# Patient Record
Sex: FEMALE | Race: WHITE | NOT HISPANIC OR LATINO | Employment: FULL TIME | URBAN - METROPOLITAN AREA
[De-identification: names, ages, dates, MRNs, and addresses within clinical notes are randomized per-mention and may not be internally consistent; named-entity substitution may affect disease eponyms.]

---

## 2020-05-28 ENCOUNTER — TELEPHONE (OUTPATIENT)
Dept: UROLOGY | Facility: MEDICAL CENTER | Age: 50
End: 2020-05-28

## 2020-06-01 ENCOUNTER — OFFICE VISIT (OUTPATIENT)
Dept: UROLOGY | Facility: CLINIC | Age: 50
End: 2020-06-01
Payer: COMMERCIAL

## 2020-06-01 VITALS
WEIGHT: 180 LBS | HEIGHT: 61 IN | BODY MASS INDEX: 33.99 KG/M2 | DIASTOLIC BLOOD PRESSURE: 70 MMHG | SYSTOLIC BLOOD PRESSURE: 138 MMHG | HEART RATE: 90 BPM

## 2020-06-01 DIAGNOSIS — N20.0 KIDNEY STONES: Primary | ICD-10-CM

## 2020-06-01 LAB
SL AMB  POCT GLUCOSE, UA: NORMAL
SL AMB LEUKOCYTE ESTERASE,UA: NORMAL
SL AMB POCT BILIRUBIN,UA: NORMAL
SL AMB POCT BLOOD,UA: NORMAL
SL AMB POCT CLARITY,UA: CLEAR
SL AMB POCT COLOR,UA: YELLOW
SL AMB POCT KETONES,UA: NORMAL
SL AMB POCT NITRITE,UA: NORMAL
SL AMB POCT PH,UA: 5
SL AMB POCT SPECIFIC GRAVITY,UA: 1.02
SL AMB POCT URINE PROTEIN: NORMAL
SL AMB POCT UROBILINOGEN: 0.2

## 2020-06-01 PROCEDURE — 81002 URINALYSIS NONAUTO W/O SCOPE: CPT | Performed by: PHYSICIAN ASSISTANT

## 2020-06-01 PROCEDURE — 99203 OFFICE O/P NEW LOW 30 MIN: CPT | Performed by: PHYSICIAN ASSISTANT

## 2020-06-01 RX ORDER — MELOXICAM 7.5 MG/1
7.5 TABLET ORAL DAILY
COMMUNITY

## 2020-06-01 RX ORDER — GABAPENTIN 300 MG/1
300 CAPSULE ORAL 3 TIMES DAILY
COMMUNITY

## 2020-06-01 RX ORDER — CYCLOBENZAPRINE HCL 5 MG
5 TABLET ORAL 3 TIMES DAILY PRN
COMMUNITY

## 2020-06-01 RX ORDER — HYDROCODONE BITARTRATE AND ACETAMINOPHEN 5; 325 MG/1; MG/1
1 TABLET ORAL EVERY 6 HOURS PRN
COMMUNITY

## 2020-06-01 RX ORDER — ONDANSETRON 4 MG/1
4 TABLET, ORALLY DISINTEGRATING ORAL EVERY 6 HOURS PRN
COMMUNITY

## 2020-06-01 RX ORDER — LORATADINE AND PSEUDOEPHEDRINE 10; 240 MG/1; MG/1
1 TABLET, EXTENDED RELEASE ORAL DAILY
COMMUNITY

## 2020-06-01 RX ORDER — PSEUDOEPHEDRINE HYDROCHLORIDE 30 MG/1
60 TABLET ORAL EVERY 4 HOURS PRN
COMMUNITY

## 2020-07-21 ENCOUNTER — TELEPHONE (OUTPATIENT)
Dept: UROLOGY | Facility: CLINIC | Age: 50
End: 2020-07-21

## 2020-07-21 NOTE — TELEPHONE ENCOUNTER
Patient called and reminded to get US done prior to appt  Patient states she need to reschedule appt due to not having a ride and US will not be done in time  Central Scheduling number was provided to patient and will call back to reschedule OV after US is completed

## 2020-08-20 ENCOUNTER — HOSPITAL ENCOUNTER (OUTPATIENT)
Dept: ULTRASOUND IMAGING | Facility: HOSPITAL | Age: 50
Discharge: HOME | End: 2020-08-20
Payer: COMMERCIAL

## 2020-08-20 DIAGNOSIS — N20.0 KIDNEY STONES: ICD-10-CM

## 2020-08-20 PROCEDURE — 76770 US EXAM ABDO BACK WALL COMP: CPT

## 2021-06-02 DIAGNOSIS — N20.0 KIDNEY STONES: Primary | ICD-10-CM

## 2021-07-09 ENCOUNTER — OFFICE VISIT (OUTPATIENT)
Dept: UROLOGY | Facility: CLINIC | Age: 51
End: 2021-07-09
Payer: COMMERCIAL

## 2021-07-09 VITALS
SYSTOLIC BLOOD PRESSURE: 122 MMHG | WEIGHT: 196.5 LBS | HEART RATE: 65 BPM | BODY MASS INDEX: 38.58 KG/M2 | DIASTOLIC BLOOD PRESSURE: 64 MMHG | HEIGHT: 60 IN

## 2021-07-09 DIAGNOSIS — N20.0 NEPHROLITHIASIS: Primary | ICD-10-CM

## 2021-07-09 LAB
BACTERIA UR QL AUTO: ABNORMAL /HPF
BILIRUB UR QL STRIP: NEGATIVE
CLARITY UR: CLEAR
COLOR UR: YELLOW
GLUCOSE UR STRIP-MCNC: NEGATIVE MG/DL
HGB UR QL STRIP.AUTO: ABNORMAL
HYALINE CASTS #/AREA URNS LPF: ABNORMAL /LPF
KETONES UR STRIP-MCNC: NEGATIVE MG/DL
LEUKOCYTE ESTERASE UR QL STRIP: ABNORMAL
NITRITE UR QL STRIP: NEGATIVE
NON-SQ EPI CELLS URNS QL MICRO: ABNORMAL /HPF
PH UR STRIP.AUTO: 6 [PH]
PROT UR STRIP-MCNC: NEGATIVE MG/DL
RBC #/AREA URNS AUTO: ABNORMAL /HPF
SL AMB  POCT GLUCOSE, UA: NORMAL
SL AMB LEUKOCYTE ESTERASE,UA: NORMAL
SL AMB POCT BILIRUBIN,UA: NORMAL
SL AMB POCT BLOOD,UA: NORMAL
SL AMB POCT CLARITY,UA: CLEAR
SL AMB POCT COLOR,UA: NORMAL
SL AMB POCT KETONES,UA: NORMAL
SL AMB POCT NITRITE,UA: NORMAL
SL AMB POCT PH,UA: 5
SL AMB POCT SPECIFIC GRAVITY,UA: 1.03
SL AMB POCT URINE PROTEIN: NORMAL
SL AMB POCT UROBILINOGEN: NORMAL
SP GR UR STRIP.AUTO: 1.01 (ref 1–1.03)
UROBILINOGEN UR QL STRIP.AUTO: 0.2 E.U./DL
WBC #/AREA URNS AUTO: ABNORMAL /HPF

## 2021-07-09 PROCEDURE — 81002 URINALYSIS NONAUTO W/O SCOPE: CPT | Performed by: PHYSICIAN ASSISTANT

## 2021-07-09 PROCEDURE — 81001 URINALYSIS AUTO W/SCOPE: CPT | Performed by: PHYSICIAN ASSISTANT

## 2021-07-09 PROCEDURE — 99213 OFFICE O/P EST LOW 20 MIN: CPT | Performed by: PHYSICIAN ASSISTANT

## 2021-07-09 RX ORDER — LORATADINE 10 MG/1
TABLET ORAL
COMMUNITY

## 2021-07-09 RX ORDER — PRAVASTATIN SODIUM 20 MG
TABLET ORAL
COMMUNITY

## 2021-07-09 NOTE — PROGRESS NOTES
7/9/2021      Chief Complaint   Patient presents with    Nephrolithiasis     Assessment and Plan    1  Nephrolithiasis   - Ultrasound kidney and bladder from 8/20/20 was normal and negative for hydronephrosis or shadowing calculi  Bilateral ureteral jets were detected  - currently asymptomatic   - Continue proper hydration and dietary modifications to prevent future kidney stone formation  2  Hx of reported microscopic hematuria  3  Hx of chemical exposure  - UA micro from 4/24/20 showed 0 RBCs  Prior urine dipsticks did show + blood  - Urine dip + leukocytes and 3+ blood  Will send out for UA micro  If 3 or more RBCs, will proceed with hematuria work-up  History of Present Illness  Colleen Jacobson is a 46 y o  female here for follow up evaluation of  Nephrolithiasis  Previously patient was seen in June of 2020 with 5 mm right UVJ stone  She completed follow-up imaging with ultrasound in August 2020 which was negative for any obstructing urinary calculi or hydronephrosis  Did report 1 episode of right upper abdominal pain that lasted 5 minutes and resolved  Otherwise currently denies any abdominal pain, flank pain, or new urinary symptoms  Denies any fever chills  She denies any prior history of kidney stones     She does report prior history of microscopic hematuria however this was only seen on preliminary urine dipsticks and urine microscopic from April 2020 was negative for microscopic hematuria  She denies any history gross hematuria  She denies any family history of  malignancy  Denies ever smoking  She does report previously working in [place] [address]  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  Review of Systems   Constitutional: Negative for chills and fever  Respiratory: Negative for shortness of breath  Cardiovascular: Negative for chest pain  Gastrointestinal: Negative for abdominal pain, constipation, diarrhea, nausea and vomiting     Genitourinary: Negative for difficulty urinating, dysuria, flank pain, frequency, hematuria and urgency  Neurological: Negative for dizziness  Past Medical History  Past Medical History:   Diagnosis Date    Heart burn     Kidney stones        Past Social History  Past Surgical History:   Procedure Laterality Date     SECTION       Social History     Tobacco Use   Smoking Status Never Smoker   Smokeless Tobacco Never Used       Past Family History  Family History   Problem Relation Age of Onset    Heart attack Father        Past Social history  Social History     Socioeconomic History    Marital status:      Spouse name: Not on file    Number of children: Not on file    Years of education: Not on file    Highest education level: Not on file   Occupational History    Not on file   Tobacco Use    Smoking status: Never Smoker    Smokeless tobacco: Never Used   Vaping Use    Vaping Use: Never used   Substance and Sexual Activity    Alcohol use: Never    Drug use: Never    Sexual activity: Not on file   Other Topics Concern    Not on file   Social History Narrative    Not on file     Social Determinants of Health     Financial Resource Strain:     Difficulty of Paying Living Expenses:    Food Insecurity:     Worried About [address] in the Last Year:     [address] in the Last Year:    Transportation Needs:     Lack of Transportation (Medical):      Lack of Transportation (Non-Medical):    Physical Activity:     Days of Exercise per Week:     Minutes of Exercise per Session:    Stress:     Feeling of Stress :    Social Connections:     Frequency of Communication with Friends and Family:     Frequency of Social Gatherings with Friends and Family:     Attends Rastafarian Services:     Active Member of Clubs or Organizations:     Attends Club or Organization Meetings:     Marital Status:    Intimate Partner Violence:     Fear of Current or Ex-Partner:     Emotionally Abused:     Physically Abused:     Sexually Abused:        Current Medications  Current Outpatient Medications   Medication Sig Dispense Refill    ALBUTEROL IN Inhale      cyclobenzaprine (FLEXERIL) 5 mg tablet Take 5 mg by mouth 3 (three) times a day as needed for muscle spasms      gabapentin (NEURONTIN) 300 mg capsule Take 300 mg by mouth 3 (three) times a day      HYDROcodone-acetaminophen (NORCO) 5-325 mg per tablet Take 1 tablet by mouth every 6 (six) hours as needed for pain      loratadine (CLARITIN) 10 mg tablet loratadine 10 mg tablet   TAKE 1 TABLET BY MOUTH ONCE DAILY      loratadine-pseudoephedrine (CLARITIN-D 24-HOUR)  mg per 24 hr tablet Take 1 tablet by mouth daily      meloxicam (MOBIC) 7 5 mg tablet Take 7 5 mg by mouth daily      ondansetron (ZOFRAN-ODT) 4 mg disintegrating tablet Take 4 mg by mouth every 6 (six) hours as needed for nausea or vomiting      pravastatin (PRAVACHOL) 20 mg tablet pravastatin 20 mg tablet   TAKE 1 TABLET BY MOUTH NIGHTLY      pseudoephedrine (SUDAFED) 30 mg tablet Take 60 mg by mouth every 4 (four) hours as needed for congestion       No current facility-administered medications for this visit  Allergies  Allergies   Allergen Reactions    Codeine     Diphenhydramine Tongue Swelling     Medicated Sleeping Pills    Oxycodone     Other Rash     Added based on information entered during case entry, please review and add reactions, type, and severity as needed         The following portions of the patient's history were reviewed and updated as appropriate: allergies, current medications, past medical history, past social history, past surgical history and problem list       Vitals  Vitals:    07/09/21 1445   BP: 122/64   BP Location: Left arm   Patient Position: Sitting   Cuff Size: Adult   Pulse: 65   Weight: 89 1 kg (196 lb 8 oz)   Height: 5' (1 524 m)           Physical Exam  Physical Exam  Constitutional:       Appearance: Normal appearance     HENT:      Head: Normocephalic and atraumatic  Right Ear: External ear normal       Left Ear: External ear normal    Eyes:      General: No scleral icterus  Conjunctiva/sclera: Conjunctivae normal    Cardiovascular:      Pulses: Normal pulses  Pulmonary:      Effort: Pulmonary effort is normal    Abdominal:      General: Abdomen is flat  Palpations: Abdomen is soft  Tenderness: There is no abdominal tenderness  There is no right CVA tenderness or left CVA tenderness  Musculoskeletal:         General: Normal range of motion  Cervical back: Normal range of motion  Skin:     General: Skin is warm and dry  Neurological:      General: No focal deficit present  Mental Status: She is alert and oriented to person, place, and time  Psychiatric:         Mood and Affect: Mood normal          Behavior: Behavior normal          Thought Content:  Thought content normal          Judgment: Judgment normal            Results  Recent Results (from the past 1 hour(s))   POCT urine dip    Collection Time: 07/09/21  3:00 PM   Result Value Ref Range    LEUKOCYTE ESTERASE,UA ++     NITRITE,UA -     SL AMB POCT UROBILINOGEN -     POCT URINE PROTEIN -      PH,UA 5 0     BLOOD,UA +++     SPECIFIC GRAVITY,UA 1 030     KETONES,UA -     BILIRUBIN,UA -     GLUCOSE, UA -      COLOR,UA pale yellow     CLARITY,UA clear    ]  No results found for: PSA  No results found for: GLUCOSE, CALCIUM, NA, K, CO2, CL, BUN, CREATININE  No results found for: WBC, HGB, HCT, MCV, PLT        Orders  Orders Placed This Encounter   Procedures    POCT urine dip       NaeemDoyline MAGGI Pelayo

## 2024-03-19 ENCOUNTER — OFFICE VISIT (OUTPATIENT)
Dept: DENTISTRY | Facility: CLINIC | Age: 54
End: 2024-03-19

## 2024-03-19 VITALS — SYSTOLIC BLOOD PRESSURE: 102 MMHG | DIASTOLIC BLOOD PRESSURE: 71 MMHG | HEART RATE: 76 BPM

## 2024-03-19 DIAGNOSIS — K04.7 PERIAPICAL ABSCESS WITHOUT SINUS: Primary | ICD-10-CM

## 2024-03-19 PROCEDURE — D0220 INTRAORAL - PERIAPICAL FIRST RADIOGRAPHIC IMAGE: HCPCS

## 2024-03-19 PROCEDURE — D0140 LIMITED ORAL EVALUATION - PROBLEM FOCUSED: HCPCS

## 2024-03-19 PROCEDURE — D0330 PANORAMIC RADIOGRAPHIC IMAGE: HCPCS

## 2024-03-19 RX ORDER — AMOXICILLIN 500 MG/1
500 CAPSULE ORAL EVERY 8 HOURS SCHEDULED
Qty: 21 CAPSULE | Refills: 0 | Status: SHIPPED | OUTPATIENT
Start: 2024-03-19 | End: 2024-03-26

## 2024-03-19 NOTE — DENTAL PROCEDURE DETAILS
Pt presents for a limited exam. Verbal consent for treatment given in addition to the forms.  Reviewed health history - Patient is ASA I  Consents signed: Yes  Perio: TBD  Pain Scale: 2  Caries Assessment: medium  Oral Hygiene instruction reviewed and given.  Hygiene recall visits recommended to the patient     Pt stated that she bit in a chocolate caramel candy and her front tooth (points to #9) chipped. Pt stated that she put an OTC dental material over the front tooth. Pt then stated that she has broken teeth, one of which she stated extracted herself. Pt states that she does have pain on her upper right and that she regularly pops the abscesses. Pt stated she had difficulty being seen by a dentist that accepts her dental insurance.    Clinical examination completed:  -oral cancer screening WNL  -E/O: WNL  -I/O:  -drained open parulis on the buccal gingiva apical to 3 and 5. 3 and 5 both have a root tip  -root tips noted on 3, 15, 14, 15, and 20  -29 missing (pt stated she extracted it herself after the tooth broke)  -9: no abscess, palpation WNL, percussion +, cold + lingering for 5 seconds Dx: reversible pulpitis    Radiographs:  PA #9: caries possibly extending to the pulp, widened PDL, no PARL  Panoramic: root tips 3, 5, 14, 15, and 20 noted. Radio-apical radiolucency noted around apex of 5.    Discussed with pt that due to the active infection she has in her teeth, her txp should be as:  Extraction root tips 3 and 5  Extraction root tips 14, 15, and 20  Palliative #9 (filling if not in the pulp chamber)  Comp exam and xrays  Pt understood and agreed to txp. Discussed with pt that the caries in her front tooth might be in the nerve, requiring further treatment like a RCT, if restorable. Pt understood.    Rx: amoxicillin 500mg q8h x 7 days sent to pt pharmacy. Confirmed no allergies to penicillin    All pt questions answered and pt understood.    Pt left satisfied and ambulatory.     Prognosis is  Good.  Referrals needed: No    Next visit: extraction root tips 3 and 5  Attending: Dr. Broussard

## 2024-04-25 ENCOUNTER — OFFICE VISIT (OUTPATIENT)
Age: 54
End: 2024-04-25
Payer: COMMERCIAL

## 2024-04-25 ENCOUNTER — APPOINTMENT (OUTPATIENT)
Age: 54
End: 2024-04-25
Payer: COMMERCIAL

## 2024-04-25 VITALS
OXYGEN SATURATION: 98 % | WEIGHT: 176.4 LBS | RESPIRATION RATE: 16 BRPM | DIASTOLIC BLOOD PRESSURE: 72 MMHG | SYSTOLIC BLOOD PRESSURE: 110 MMHG | BODY MASS INDEX: 34.45 KG/M2 | TEMPERATURE: 98.5 F | HEART RATE: 84 BPM

## 2024-04-25 DIAGNOSIS — S63.501A SPRAIN OF RIGHT WRIST, INITIAL ENCOUNTER: ICD-10-CM

## 2024-04-25 DIAGNOSIS — M79.641 HAND PAIN, RIGHT: ICD-10-CM

## 2024-04-25 DIAGNOSIS — M93.839: Primary | ICD-10-CM

## 2024-04-25 PROCEDURE — 73110 X-RAY EXAM OF WRIST: CPT

## 2024-04-25 PROCEDURE — 99213 OFFICE O/P EST LOW 20 MIN: CPT | Performed by: PHYSICIAN ASSISTANT

## 2024-04-25 NOTE — PROGRESS NOTES
[facility]        NAME: Albina Dykes is a 54 y.o. female  : 1970    MRN: [id number]  DATE: 2024  TIME: 7:23 PM    Assessment and Plan   Adult osteochondrosis of carpal lunate [M93.839]  1. Adult osteochondrosis of carpal lunate  Ambulatory Referral to Orthopedic Surgery      2. Hand pain, right  XR wrist 3+ vw right    CANCELED: XR hand 3+ vw right      3. 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    Shared decision-making with the patient was engaged in.  All questions, concerns and  complaints were answered and addressed to the patient's satisfaction.    Patient Instructions   There are no Patient Instructions on file for this visit.    Follow up with PCP in 3-5 days.  Proceed to  ER if symptoms worsen.    If tests are performed, our office will contact you with results only if   changes need to made to the care plan discussed with you at the visit.   [facility]     Chief Complaint     Chief Complaint   Patient presents with   • Hand Injury     Pain R hand. Claims while carrying objects missed a step and fell down 5 carpeted wooden steps 1 1/2 weeks ago. Denies other injury. Pain increases with movement, gripping         History of Present Illness       HPI  Patient presents complaining of right wrist and hand pain.  She reports that she fell down the stairs 5 days ago.  She does report history of cervical radiculopathy.  She is right-hand dominant.  She has pain with gripping the dorsal and volar wrist.  She has numbness and tingling with gripping as well but no numbness and tingling at rest.    Review of Systems   Review of  Systems  All other related systems reviewed and are negative except as noted in HPI    Current Medications       Current Outpatient Medications:   •  ALBUTEROL IN, Inhale, Disp: , Rfl:   •  cyclobenzaprine (FLEXERIL) 5 mg tablet, Take 5 mg by mouth 3 (three) times a day as needed for muscle spasms, Disp: , Rfl:   •  loratadine (CLARITIN) 10 mg tablet, loratadine 10 mg tablet  TAKE 1 TABLET BY MOUTH ONCE DAILY, Disp: , Rfl:   •  loratadine-pseudoephedrine (CLARITIN-D 24-HOUR)  mg per 24 hr tablet, Take 1 tablet by mouth daily, Disp: , Rfl:   •  meloxicam (MOBIC) 7.5 mg tablet, Take 7.5 mg by mouth daily, Disp: , Rfl:   •  pravastatin (PRAVACHOL) 20 mg tablet, pravastatin 20 mg tablet  TAKE 1 TABLET BY MOUTH NIGHTLY, Disp: , Rfl:   •  pseudoephedrine (SUDAFED) 30 mg tablet, Take 60 mg by mouth every 4 (four) hours as needed for congestion, Disp: , Rfl:   •  gabapentin (NEURONTIN) 300 mg capsule, Take 300 mg by mouth 3 (three) times a day (Patient not taking: Reported on 4/25/2024), Disp: , Rfl:   •  HYDROcodone-acetaminophen (NORCO) 5-325 mg per tablet, Take 1 tablet by mouth every 6 (six) hours as needed for pain (Patient not taking: Reported on 4/25/2024), Disp: , Rfl:   •  ondansetron (ZOFRAN-ODT) 4 mg disintegrating tablet, Take 4 mg by mouth every 6 (six) hours as needed for nausea or vomiting (Patient not taking: Reported on 4/25/2024), Disp: , Rfl:     Current Allergies     Allergies as of 04/25/2024 - Reviewed 04/25/2024   Allergen Reaction Noted   • Codeine  10/11/2012   • Diphenhydramine Tongue Swelling 04/20/2021   • Oxycodone  02/21/2019   • Other Rash 03/02/2020            The following portions of the patient's history were reviewed and updated as appropriate: allergies, current medications, past family history, past medical history, past social history, past surgical history and problem list.     Past Medical History:   Diagnosis Date   • Heart burn    • Kidney stones        Past Surgical History:  "  Procedure Laterality Date   •  SECTION         Family History   Problem Relation Age of Onset   • Heart attack Father          Medications have been verified.        Objective   /72 (BP Location: Left arm, Patient Position: Sitting, Cuff Size: Adult)   Pulse 84   Temp 98.5 °F (36.9 °C) (Tympanic)   Resp 16   Wt 80 kg (176 lb 6.4 oz)   LMP 2024 (Approximate) Comment: Very irregular menses  SpO2 98%   BMI 34.45 kg/m²   Patient's last menstrual period was 2024 (approximate).       Physical Exam     Physical Exam    Right Hand Exam     Tenderness   Right hand tenderness location: Tenderness over the radiocarpal joint and lunate no tenderness over the scaphoid or snuffbox.    Range of Motion   Wrist   Extension:  abnormal   Flexion:  abnormal   Pronation:  normal   Supination:  normal     Other   Erythema: absent  Scars: absent  Sensation: normal  Pulse: present            I have personally reviewed pertinent films in PACS and my interpretation is x-ray of the right wrist performed today demonstrates no acute displaced fracture there is some degenerative changes seen in the visualized IP joints of the fingers, there is no visualized scaphoid fracture, there are cystic lucencies in the lunate as well as ulnar positivity in the wrist.    Procedures  No Procedures performed today        Note: Portions of this record may have been created with voice recognition software. Occasional wrong word or \"sound a like\" substitutions may have occurred due to the inherent limitations of voice recognition software. Please read the chart carefully and recognize, using context, where substitutions have occurred.*      "

## 2024-05-14 ENCOUNTER — OFFICE VISIT (OUTPATIENT)
Dept: OBGYN CLINIC | Facility: CLINIC | Age: 54
End: 2024-05-14
Payer: COMMERCIAL

## 2024-05-14 VITALS
DIASTOLIC BLOOD PRESSURE: 78 MMHG | HEIGHT: 60 IN | SYSTOLIC BLOOD PRESSURE: 115 MMHG | HEART RATE: 65 BPM | BODY MASS INDEX: 33.22 KG/M2 | WEIGHT: 169.2 LBS

## 2024-05-14 DIAGNOSIS — S69.91XA INJURY OF RIGHT WRIST, INITIAL ENCOUNTER: Primary | ICD-10-CM

## 2024-05-14 PROCEDURE — 99203 OFFICE O/P NEW LOW 30 MIN: CPT | Performed by: FAMILY MEDICINE

## 2024-05-14 RX ORDER — FLUTICASONE PROPIONATE 50 MCG
2 SPRAY, SUSPENSION (ML) NASAL DAILY
COMMUNITY
Start: 2023-12-18

## 2024-05-14 RX ORDER — PRAVASTATIN SODIUM 40 MG
40 TABLET ORAL EVERY EVENING
COMMUNITY
Start: 2024-03-13

## 2024-05-14 NOTE — PROGRESS NOTES
Assessment/Plan:  Assessment/Plan   Diagnoses and all orders for this visit:    Injury of right wrist, initial encounter  -     MRI wrist right wo contrast; Future    Other orders  -     fluticasone (FLONASE) 50 mcg/act nasal spray; 2 sprays into each nostril daily  -     pravastatin (PRAVACHOL) 40 mg tablet; Take 40 mg by mouth every evening (Patient not taking: Reported on 5/14/2024)      54-year-old ambidextrous female with onset of right wrist pain from FOOSH injury 4 weeks ago. Discussed with patient physical exam, radiographs, impression, and plan.  X-rays right wrist are unremarkable for acute osseous abnormality but noted for cystic change within the lunate.  Physical exam right wrist noted for tenderness dorsum at the radiocarpal joint and at the snuffbox. She has intact range of motion and strength of the wrist and digits of the hand.  DRUJ stress is unremarkable.  There is positive axial load.  She has normal sensation and radial pulse.  Her mechanism of injury, subsequent symptoms, and clinical exam are concerning for occult fracture.  Symptoms persist despite treatment in the form of wrist brace since 4/25/2024, icing, and Tylenol.  At this time I will refer for MRI of the right wrist to evaluate for occult scaphoid fracture as surgical intervention may be warranted.  She will return after having MRI done.            Subjective:   Patient ID: Albina Dykes is a 54 y.o. female.  Chief Complaint   Patient presents with    Right Wrist - Pain        54-year-old ambidextrous female presents for evaluation right wrist pain sustained from FOOSH injury 4 weeks ago.  She tripped going on the stairs and fell onto her outstretched hand.  She had pain described as sudden in onset, generalized to the wrist, achy and sore, mild intensity, and nonradiating.  She continued with activity.  About 1 week later symptoms became more intense.  She had sharp severe pain at the wrist and she difficulty with holding  objects.  She presented to urgent care where x-ray evaluation was unremarkable for acute osseous abnormality.  She was placed in wrist brace and advised to follow-up with orthopedic care.  She states that while in the brace symptoms are more tolerable, but with bearing weight on the wrist is bothersome.    Wrist Pain  This is a new problem. The current episode started 1 to 4 weeks ago. The problem occurs daily. The problem has been unchanged. Associated symptoms include arthralgias. Pertinent negatives include no joint swelling, numbness or weakness. The symptoms are aggravated by bending (Weightbearing, lifting). She has tried rest, immobilization, ice and acetaminophen for the symptoms. The treatment provided mild relief.           The following portions of the patient's history were reviewed and updated as appropriate: She  has a past medical history of Heart burn and Kidney stones.  She is allergic to codeine, diphenhydramine, latex, oxycodone, and other..    Review of Systems   Musculoskeletal:  Positive for arthralgias. Negative for joint swelling.   Neurological:  Negative for weakness and numbness.       Objective:  Vitals:    05/14/24 0949   BP: 115/78   Pulse: 65   Weight: 76.7 kg (169 lb 3.2 oz)   Height: 5' (1.524 m)      Right Hand Exam     Tenderness   The patient is experiencing tenderness in the snuff box.    Range of Motion   The patient has normal right wrist ROM.     Muscle Strength   Wrist extension: 5/5   Wrist flexion: 5/5   : 5/5     Tests   Tinel's sign (median nerve): negative  Finkelstein's test: negative    Other   Sensation: normal  Pulse: present          Observations     Right Wrist/Hand   Negative for deformity.     Tenderness     Right Wrist/Hand   Tenderness in the scaphoid. No tenderness in the first dorsal compartment, second dorsal compartment, fifth dorsal compartment, sixth dorsal compartment, CMC joint, TFCC, distal radioulnar joint and lunate.     Strength/Myotome Testing      Right Wrist/Hand   Wrist extension: 5  Wrist flexion: 5    Tests     Right Wrist/Hand   Positive TFCC load.   Negative distal radial-ulnar joint stress, Finkelstein's and Tinel's sign (medial nerve).       Physical Exam  Vitals and nursing note reviewed.   Constitutional:       Appearance: Normal appearance. She is well-developed. She is not ill-appearing or diaphoretic.   HENT:      Head: Normocephalic and atraumatic.      Right Ear: External ear normal.      Left Ear: External ear normal.   Eyes:      Conjunctiva/sclera: Conjunctivae normal.   Neck:      Trachea: No tracheal deviation.   Cardiovascular:      Rate and Rhythm: Normal rate.   Pulmonary:      Effort: Pulmonary effort is normal. No respiratory distress.   Abdominal:      General: There is no distension.   Musculoskeletal:         General: Tenderness present. No swelling, deformity or signs of injury.      Right hand: No deformity.   Skin:     General: Skin is warm and dry.      Coloration: Skin is not jaundiced or pale.   Neurological:      Mental Status: She is alert and oriented to person, place, and time.   Psychiatric:         Mood and Affect: Mood normal.         Behavior: Behavior normal.         Thought Content: Thought content normal.         Judgment: Judgment normal.         I have personally reviewed pertinent films in PACS and my interpretation is  .  X-rays right wrist are unremarkable for acute osseous abnormality but noted for cystic change within the lunate.

## 2024-05-23 ENCOUNTER — HOSPITAL ENCOUNTER (OUTPATIENT)
Dept: MRI IMAGING | Facility: CLINIC | Age: 54
Discharge: HOME | End: 2024-05-23
Payer: COMMERCIAL

## 2024-05-23 DIAGNOSIS — S69.91XA INJURY OF RIGHT WRIST, INITIAL ENCOUNTER: ICD-10-CM

## 2024-05-23 PROCEDURE — 73221 MRI JOINT UPR EXTREM W/O DYE: CPT

## 2024-05-31 ENCOUNTER — OFFICE VISIT (OUTPATIENT)
Dept: OBGYN CLINIC | Facility: CLINIC | Age: 54
End: 2024-05-31
Payer: COMMERCIAL

## 2024-05-31 VITALS
BODY MASS INDEX: 33.18 KG/M2 | DIASTOLIC BLOOD PRESSURE: 74 MMHG | WEIGHT: 169 LBS | SYSTOLIC BLOOD PRESSURE: 108 MMHG | HEIGHT: 60 IN | HEART RATE: 74 BPM

## 2024-05-31 DIAGNOSIS — S63.501D SPRAIN OF RIGHT WRIST, SUBSEQUENT ENCOUNTER: ICD-10-CM

## 2024-05-31 DIAGNOSIS — M19.031 ARTHRITIS OF RIGHT WRIST: Primary | ICD-10-CM

## 2024-05-31 DIAGNOSIS — M25.631 WRIST STIFFNESS, RIGHT: ICD-10-CM

## 2024-05-31 PROCEDURE — 99213 OFFICE O/P EST LOW 20 MIN: CPT | Performed by: FAMILY MEDICINE

## 2024-05-31 NOTE — PROGRESS NOTES
Assessment/Plan:  Assessment & Plan   Diagnoses and all orders for this visit:    Arthritis of right wrist  -     Ambulatory Referral to PT/OT Hand Therapy; Future    Sprain of right wrist, subsequent encounter  -     Ambulatory Referral to PT/OT Hand Therapy; Future    Wrist stiffness, right  -     Ambulatory Referral to PT/OT Hand Therapy; Future          54-year-old ambidextrous female with onset of right wrist pain from FOOSH injury 6 weeks ago.  Discussed with patient MRI results, impression, and plan.  MRI of the right wrist is unremarkable for acute osseous abnormality.  There is mild degenerative change of the wrist.  Clinical impression is that she may have sustained sprain to the wrist with aggravation of arthritis.  I advised that surgery is not warranted nor does she need prolonged immobilization.  I recommend she begin weaning out of her cock-up wrist splint.  She is to start taking turmeric, tart cherry, and glucosamine supplements.  She is to apply topical diclofenac 3 times daily for the next 10 days.  She is to start formal therapy as soon as possible and do home exercises as directed.  She will follow-up with me as needed.      Subjective:   Patient ID: Albina Dykes is a 54 y.o. female.  Chief Complaint   Patient presents with    Right Wrist - Follow-up        54-year-old ambidextrous female following for onset of right wrist pain from FOOSH injury 6 weeks ago.  She was last seen by me 4 weeks ago at which point.  States that symptoms have been gradually improving since her last visit.  She has pain described as generalized to the wrist, achy and sore, worse with bearing weight and with heavy lifting, and improved with resting.  She reports that task such as holding a pitcher and pouring into a glass is bothersome.    Wrist Pain  This is a new problem. The current episode started more than 1 month ago. The problem occurs daily. The problem has been gradually improving. Associated symptoms  include arthralgias. Pertinent negatives include no joint swelling, numbness or weakness. The symptoms are aggravated by twisting and bending. She has tried rest, immobilization, position changes and ice for the symptoms. The treatment provided mild relief.               Review of Systems   Musculoskeletal:  Positive for arthralgias. Negative for joint swelling.   Neurological:  Negative for weakness and numbness.       Objective:  Vitals:    05/31/24 1311   BP: 108/74   Pulse: 74   Weight: 76.7 kg (169 lb)   Height: 5' (1.524 m)      Ortho Exam    Physical Exam  Vitals and nursing note reviewed.   Constitutional:       Appearance: Normal appearance. She is well-developed. She is not ill-appearing or diaphoretic.   HENT:      Head: Normocephalic and atraumatic.      Right Ear: External ear normal.      Left Ear: External ear normal.   Eyes:      Conjunctiva/sclera: Conjunctivae normal.   Neck:      Trachea: No tracheal deviation.   Cardiovascular:      Rate and Rhythm: Normal rate.   Pulmonary:      Effort: Pulmonary effort is normal. No respiratory distress.   Abdominal:      General: There is no distension.   Skin:     General: Skin is warm and dry.      Coloration: Skin is not jaundiced or pale.   Neurological:      Mental Status: She is alert and oriented to person, place, and time.   Psychiatric:         Mood and Affect: Mood normal.         Behavior: Behavior normal.         Thought Content: Thought content normal.         Judgment: Judgment normal.         I have personally reviewed pertinent films in PACS and my interpretation is  .  Cystic change within the lunate.  No acute fracture.

## 2024-05-31 NOTE — PATIENT INSTRUCTIONS
Over the counter diclofenac gel/voltaren  - 3 times daily for 10 days    Over the counter vitamins:    - Turmeric vitamin at least 1000 mg daily    - Tart cherry vitamin at least 1000 mg daily    - Glucosamine-chondrointin 2-3 times daily     Epsom Salt Soaks

## 2024-06-17 ENCOUNTER — OFFICE VISIT (OUTPATIENT)
Dept: DENTISTRY | Facility: CLINIC | Age: 54
End: 2024-06-17

## 2024-06-17 VITALS — HEART RATE: 73 BPM | DIASTOLIC BLOOD PRESSURE: 70 MMHG | SYSTOLIC BLOOD PRESSURE: 115 MMHG

## 2024-06-17 DIAGNOSIS — K04.7 DENTAL INFECTION: Primary | ICD-10-CM

## 2024-06-17 PROBLEM — E66.9 CLASS 2 OBESITY WITH BODY MASS INDEX (BMI) OF 36.0 TO 36.9 IN ADULT: Status: ACTIVE | Noted: 2020-02-17

## 2024-06-17 PROBLEM — M54.16 LUMBAR RADICULOPATHY: Status: ACTIVE | Noted: 2020-05-21

## 2024-06-17 PROBLEM — J30.2 SEASONAL ALLERGIES: Status: ACTIVE | Noted: 2023-09-21

## 2024-06-17 PROBLEM — N39.46 MIXED STRESS AND URGE URINARY INCONTINENCE: Status: ACTIVE | Noted: 2019-09-30

## 2024-06-17 PROBLEM — G89.29 CHRONIC BILATERAL BACK PAIN: Status: ACTIVE | Noted: 2020-02-17

## 2024-06-17 PROBLEM — Z87.39 HISTORY OF HERNIATED INTERVERTEBRAL DISC: Status: ACTIVE | Noted: 2020-02-17

## 2024-06-17 PROBLEM — M51.16 HERNIATION OF NUCLEUS PULPOSUS OF LUMBAR INTERVERTEBRAL DISC WITH SCIATICA: Status: ACTIVE | Noted: 2020-04-07

## 2024-06-17 PROBLEM — E78.2 MIXED HYPERLIPIDEMIA: Status: ACTIVE | Noted: 2021-01-20

## 2024-06-17 PROBLEM — Z87.39 HISTORY OF MUSCULOSKELETAL DISEASE: Status: ACTIVE | Noted: 2020-02-17

## 2024-06-17 PROBLEM — M25.569 KNEE PAIN: Status: ACTIVE | Noted: 2020-01-16

## 2024-06-17 PROBLEM — N20.0 RIGHT NEPHROLITHIASIS: Status: ACTIVE | Noted: 2020-05-27

## 2024-06-17 PROBLEM — Z13.9 SCREENING FOR CONDITION: Status: ACTIVE | Noted: 2024-02-16

## 2024-06-17 PROBLEM — M54.50 LOW BACK PAIN: Status: ACTIVE | Noted: 2020-09-01

## 2024-06-17 PROBLEM — R13.10 DIFFICULTY SWALLOWING PILLS: Status: ACTIVE | Noted: 2023-09-21

## 2024-06-17 PROBLEM — E66.812 CLASS 2 OBESITY WITH BODY MASS INDEX (BMI) OF 36.0 TO 36.9 IN ADULT: Status: ACTIVE | Noted: 2020-02-17

## 2024-06-17 PROBLEM — N95.0 POSTMENOPAUSAL BLEEDING: Status: ACTIVE | Noted: 2020-03-02

## 2024-06-17 PROBLEM — E66.9 OBESITY: Status: ACTIVE | Noted: 2020-02-17

## 2024-06-17 PROBLEM — K22.89 ESOPHAGEAL THICKENING: Status: ACTIVE | Noted: 2020-05-27

## 2024-06-17 PROBLEM — N39.46 MIXED INCONTINENCE: Status: ACTIVE | Noted: 2019-09-30

## 2024-06-17 PROBLEM — M54.9 CHRONIC BILATERAL BACK PAIN: Status: ACTIVE | Noted: 2020-02-17

## 2024-06-17 PROCEDURE — D0140 LIMITED ORAL EVALUATION - PROBLEM FOCUSED: HCPCS

## 2024-06-17 RX ORDER — AMOXICILLIN 500 MG/1
500 CAPSULE ORAL EVERY 8 HOURS SCHEDULED
Qty: 21 CAPSULE | Refills: 0 | Status: SHIPPED | OUTPATIENT
Start: 2024-06-17 | End: 2024-06-24

## 2024-06-17 NOTE — DENTAL PROCEDURE DETAILS
Pt presents for a limited exam. Verbal consent for treatment given in addition to the forms.  Reviewed health history - Patient is ASA II  Consents signed: Yes  Perio: TBD  Pain Scale: 0  Caries Assessment: medium  Oral Hygiene instruction reviewed and given.  Hygiene recall visits recommended to the patient    Pt states that she is aware her extraction appt is next week, but is concerned of reinfection and wanted some more antibiotics prior to extraction visit. Pt states she thinks she has an abscess on her upper right tooth.     Clinical examination completed:  -oral cancer screening WNL  -E/O: WNL  -I/O:  -chronic abscess noted on buccal gingiva apical to 5  -root tips noted on 3, 5, 13, 15, and 20    After consult with Dr. Broussard, rx: amoxicillin 500mg q8h x 7 days written to pt pharmacy. Confirmed no pt allergies to penicillin and pt stated she reacted well to the previous round given in March. All pt questions answered and pt understood.     TxP:  Extraction root tips 3 and 5  Extraction root tips 13, 15, and 20  Palliative #9 (filling if not in the pulp chamber)  Comp exam and xrays  Pt understood and agreed to txp. Discussed with pt that the caries in her front tooth might be in the nerve, requiring further treatment like a RCT, if restorable. Pt understood.      Pt left satisfied and ambulatory.     Prognosis is Good.  Referrals needed: No     Next visit: extraction root tips 3 and 5  Attending: Dr. Broussard

## 2024-06-27 ENCOUNTER — OFFICE VISIT (OUTPATIENT)
Dept: DENTISTRY | Facility: CLINIC | Age: 54
End: 2024-06-27

## 2024-06-27 VITALS — DIASTOLIC BLOOD PRESSURE: 85 MMHG | HEART RATE: 65 BPM | SYSTOLIC BLOOD PRESSURE: 122 MMHG

## 2024-06-27 DIAGNOSIS — K02.9 DENTAL CARIES: Primary | ICD-10-CM

## 2024-06-27 PROCEDURE — D2335 RESIN-BASED COMPOSITE - 4 OR MORE SURFACES OR INVOLVING INCISAL ANGLE (ANTERIOR): HCPCS

## 2024-06-27 NOTE — DENTAL PROCEDURE DETAILS
Composite Restoration #9 MIDFL    Albina Dykes 54 y.o. female presents with self to [facility] for composite restoration  PMH reviewed, no changes, ASA II.   Pain level 5/10  Pt was originally scheduled for extractions, but stated that she wants front tooth treated first. Pt put an OTC temporary on tooth #9 due to fracture.    Diagnosis:  Caries #9 MIDFL    Prognosis:  guarded    Consent:  Reviewed diagnosis of caries and tx plan for composite restorations.  Risks of specific procedure: need for RCT if pulp exposure occurs or in future if pulp is inflamed, need to revise tx plan based on extent of decay, damage to adjacent tooth and/or restoration,   Risks of any dental procedure: post procedural pain or sensitivity, local anesthetic side effects, allergic reaction to dental materials and medications, breakage of local anesthetic needle, aspiration of small dental tools, injury to nearby hard and soft tissues and anatomical structures.  Benefits: prevent further breakdown of tooth and its sequelae,   Alternatives:  no tx.  Patient understands and consents.    Anesthesia:  Topical 20% benzocaine.  1 carps 2% Lidocaine 1:100k epi via infiltration.    Procedure details:  Isolation: Cotton rolls and High volume suction  Prepped teeth #9 with high speed handpiece.  Caries removed with round carbide on slow speed and spoon excavator.  Pulpal blushing evident but no exposure.  Placed Mylar strip and wedge.   Limelight placed on pulpal floor and  cured.  Ionostar GI placed 3mm thick base and hardened.  Etch with 37% H2PO4 15 seconds. Rinsed and suctioned.  Applied Ivoclar Adhesive universal  with 20 second scrub, air dried, and light cured.  Restored with Ivoclar Tetric Evoceram and Tetric Evoflow Shade A2 and light cured.  Checked occlusion and adjusted with finishing burs.  Checked contacts with floss  Polished with enhance point.  Verified occlusion and contacts.    POI given, pt informed to monitor tooth  for symptoms and the discoloration is due to GI which was indicated due to proximity to pulp chamber. Pt understands  Patient dismissed ambulatory and alert.    Attending: Aarti    NV: ext #3,5

## 2024-07-17 ENCOUNTER — OFFICE VISIT (OUTPATIENT)
Dept: DENTISTRY | Facility: CLINIC | Age: 54
End: 2024-07-17

## 2024-07-17 VITALS — HEART RATE: 83 BPM | SYSTOLIC BLOOD PRESSURE: 107 MMHG | DIASTOLIC BLOOD PRESSURE: 72 MMHG

## 2024-07-17 DIAGNOSIS — K04.5: Primary | ICD-10-CM

## 2024-07-17 PROBLEM — Z13.9 SCREENING FOR CONDITION: Status: RESOLVED | Noted: 2024-02-16 | Resolved: 2024-07-17

## 2024-07-17 PROCEDURE — D7140 EXTRACTION, ERUPTED TOOTH OR EXPOSED ROOT (ELEVATION AND/OR FORCEPS REMOVAL): HCPCS | Performed by: DENTIST

## 2024-07-17 RX ORDER — AMOXICILLIN 500 MG/1
500 CAPSULE ORAL EVERY 8 HOURS SCHEDULED
Qty: 21 CAPSULE | Refills: 0 | Status: SHIPPED | OUTPATIENT
Start: 2024-07-17 | End: 2024-07-24

## 2024-07-17 RX ORDER — IBUPROFEN 600 MG/1
600 TABLET ORAL EVERY 6 HOURS PRN
Qty: 24 TABLET | Refills: 0 | Status: SHIPPED | OUTPATIENT
Start: 2024-07-17 | End: 2024-07-23

## 2024-07-17 NOTE — PROGRESS NOTES
Extraction # 5    Albina Dykes 54 y.o. female presents with self to [facility] for extraction #5  PMH reviewed, no changes, ASA II. Significant medical history:  Significant allergies: NONE Significant medications: GABAPENTIN  Pain level 6/10    Diagnosis:  Teeth # 5 indicated for extraction due to Nonrestorable caries and Retained root tip.    Consent:  Risks of specific procedure: pain, bleeding, swelling, infection, tooth fracturing to point of requiring surgical removal, damage to adjacent teeth and/or restorations on them, 5  Risks of any dental procedure: post procedural pain or sensitivity, local anesthetic side effects, allergic reaction to dental materials and medications, breakage of local anesthetic needle, aspiration of small dental tools, injury to nearby hard and soft tissues and anatomical structures.  Benefits: relieve pain or underlying infection, prevent future or further progression of infection,  Alternatives:no tx.  Tx plan for extraction #5  reviewed, pt given opportunity to ask questions, all questions answered to degree of medical and dental certainty.  Patient understands and consent given by self via signed oral surgery informed consent.    Universal Protoco  Other Assisting Provider: Yes,  OJANN(assistant)  Verbal consent obtained? YES  Written consent obtained?  YES  Risks, benefits and alternatives discussed?: YES  Consent given by: self  Time Out  Immediately prior to the procedure a time out was called: YES  Time Out:  Time Out performed at:  5:11 AM  A time out verifies correct patient, procedure, equipment, support staff and site/side marked as required.  Patient states understanding of procedure being performed: YES  Patient's understanding of procedure matches consent: YES  Procedure consent matches procedure scheduled: YES  Test results available and properly labeled: N/A  Site  Verified with the patient  YES  Radiology Images displayed and confirmed.  If images not available,  report reviewed:  YES  Required items - Required blood products, implants, devices and special equipment available: YES  Patient identity confirmed:  YES    Anesthesia:  Topical 20% benzocaine.  3ML- 4% SEPTOCAINE W EPI 1:100 k via inf. Given.    Procedure details:  Reflected gingiva with periosteal elevator.  Elevated, and extracted # 5 with straight elevator(s) and/or forceps.  Socket curetted and irrigated with sterile saline.  Manual alveolar compression with gauze 30 seconds. Hemostasis achieved.   No sutures placed.     Post-op instructions given verbally and on paper.  Patient given ice and gauze.    Rx: amox, ibuprofen    Patient dismissed ambulatory and alert.    NV: ext  Nate castellanos/ teresa  da

## 2024-07-22 ENCOUNTER — OFFICE VISIT (OUTPATIENT)
Dept: DENTISTRY | Facility: CLINIC | Age: 54
End: 2024-07-22

## 2024-07-22 VITALS — HEART RATE: 76 BPM | DIASTOLIC BLOOD PRESSURE: 76 MMHG | SYSTOLIC BLOOD PRESSURE: 109 MMHG

## 2024-07-22 DIAGNOSIS — K08.9 EXTRACTION OF TOOTH NEEDED: ICD-10-CM

## 2024-07-22 DIAGNOSIS — K08.89 NONRESTORABLE TOOTH: Primary | ICD-10-CM

## 2024-07-22 PROCEDURE — D7210 EXTRACTION, ERUPTED TOOTH REQUIRING REMOVAL OF BONE AND/OR SECTIONING OF TOOTH, AND INCLUDING ELEVATION OF MUCOPERIOSTEAL FLAP IF INDICATED: HCPCS

## 2024-07-22 RX ORDER — CHLORHEXIDINE GLUCONATE ORAL RINSE 1.2 MG/ML
15 SOLUTION DENTAL 2 TIMES DAILY
Qty: 120 ML | Refills: 0 | Status: SHIPPED | OUTPATIENT
Start: 2024-07-22 | End: 2024-07-26

## 2024-07-22 NOTE — DENTAL PROCEDURE DETAILS
Surgical Extraction #3    Albina Dykes 54 y.o. female presents with self to [facility] for surgical extraction #3.  PMH reviewed, no changes, ASA II. Significant medical history: No contraindications to tx today. Significant allergies: Pt indicates they had an infection to sutures used for  and allergy to Latex. Significant medications: No contraindications to extraction of #3.  Pain level 3/10.    Diagnosis:  Teeth #3 indicated for extraction due to Nonrestorable caries, Periodontally hopeless prognosis , Retained root tip, and Necessary for restorative treatment plan    Consent:  Risks of specific procedure: pain, bleeding, swelling, infection, tooth fracturing to point of requiring surgical removal, damage to adjacent teeth and/or restorations on them.  Risks of any dental procedure: post procedural pain or sensitivity, local anesthetic side effects, allergic reaction to dental materials and medications, breakage of local anesthetic needle, aspiration of small dental tools, injury to nearby hard and soft tissues and anatomical structures.  Benefits: relieve pain or underlying infection, prevent future or further progression of infection.  Alternatives: referral to have procedure done by [facility], no tx.  Tx plan for extraction #3 reviewed, pt given opportunity to ask questions, all questions answered to degree of medical and dental certainty.  Patient understands and consent given by self via signed oral surgery informed consent.    Universal Protocol  Other Assisting Provider: Yes, Janine (assistant)  Verbal consent obtained? YES  Written consent obtained?  YES  Risks, benefits and alternatives discussed?: YES  Consent given by: self  Time Out  Immediately prior to the procedure a time out was called: YES  Time Out:  Time Out performed at:  11:30 AM  A time out verifies correct patient, procedure, equipment, support staff and site/side marked as required.  Patient states understanding of procedure being  performed: YES  Patient's understanding of procedure matches consent: YES  Procedure consent matches procedure scheduled: YES  Test results available and properly labeled: N/A  Site  Verified with the patient  YES  Radiology Images displayed and confirmed.  If images not available, report reviewed:  YES  Required items - Required blood products, implants, devices and special equipment available: YES  Patient identity confirmed:  YES    Anesthesia:  1 carps 4% Septocaine 1:100k epi via buccal infiltration and palatal/lingual infiltration.    Procedure details:  Envelope flap created from ##2 to #4 on buccal, palatal using #15 scalpel, with vertical releasing incisions.  Flap reflected with periosteal elevator.  Elevated and extracted #3 retained root tips with straight elevator(s) and/or forceps.  Socket curetted and irrigated with sterile saline.  Manual alveolar compression with gauze 30 seconds. Hemostasis achieved.  Two simple interrupted 4-0 chromic gut sutures placed.     Post-op instructions given verbally and on paper.  Patient given ice and gauze.    Rx: Peridex.    Patient dismissed ambulatory and alert.    NV: Extractions UL and LL.    Attending: Dr. Ordonez examined pt and assisted Dr. Aden with extraction.

## 2024-08-19 ENCOUNTER — OFFICE VISIT (OUTPATIENT)
Dept: DENTISTRY | Facility: CLINIC | Age: 54
End: 2024-08-19

## 2024-08-19 VITALS — SYSTOLIC BLOOD PRESSURE: 97 MMHG | HEART RATE: 82 BPM | DIASTOLIC BLOOD PRESSURE: 62 MMHG

## 2024-08-19 DIAGNOSIS — S09.93XA: Primary | ICD-10-CM

## 2024-08-19 DIAGNOSIS — K08.3: Primary | ICD-10-CM

## 2024-08-19 PROCEDURE — D7210 EXTRACTION, ERUPTED TOOTH REQUIRING REMOVAL OF BONE AND/OR SECTIONING OF TOOTH, AND INCLUDING ELEVATION OF MUCOPERIOSTEAL FLAP IF INDICATED: HCPCS

## 2024-08-19 NOTE — DENTAL PROCEDURE DETAILS
Extraction #13, 15, 20    Albina Dykes 54 y.o. female presents with self to [facility] for extraction #13, 15, 20  PMH reviewed, no changes, ASA II. Significant medical history: Reviewed. Significant allergies: Reviewed. Significant medications: Reviewed.  Pain level 2/10    Diagnosis:  Teeth #13, 15, 20 indicated for extraction due to Nonrestorable caries and Retained root tip.    Consent:  Risks of specific procedure: pain, bleeding, swelling, infection, tooth fracturing to point of requiring surgical removal, damage to adjacent teeth and/or restorations on them, .  Risks of any dental procedure: post procedural pain or sensitivity, local anesthetic side effects, allergic reaction to dental materials and medications, breakage of local anesthetic needle, aspiration of small dental tools, injury to nearby hard and soft tissues and anatomical structures.  Benefits: relieve pain or underlying infection, prevent future or further progression of infection, .  Alternatives: 2nd opinion, no tx.  Tx plan for extraction #13, 15, 20 reviewed, pt given opportunity to ask questions, all questions answered to degree of medical and dental certainty.  Patient understands and consent given by self via verbal consent.    Universal Protocol  Other Assisting Provider: Yes, Ttcamryn (assistant)  Verbal consent obtained? YES  Written consent obtained?  YES  Risks, benefits and alternatives discussed?: YES  Consent given by: self  Time Out  Immediately prior to the procedure a time out was called: YES  Time Out:  Time Out performed at:  8:40 AM  A time out verifies correct patient, procedure, equipment, support staff and site/side marked as required.  Patient states understanding of procedure being performed: YES  Patient's understanding of procedure matches consent: YES  Procedure consent matches procedure scheduled: YES  Test results available and properly labeled: N/A  Site  Verified with the patient  YES  Radiology Images displayed  and confirmed.  If images not available, report reviewed:  YES  Required items - Required blood products, implants, devices and special equipment available: YES  Patient identity confirmed:  YES    Anesthesia:  Topical 20% benzocaine.  3 carps 2% Lidocaine 1:100k epi via RAUL block, buccal infiltration, and palatal/lingual infiltration.    Procedure details:  Reflected gingiva with periosteal elevator.  Elevated, and extracted #13, 15, 20 with straight elevator(s) and/or forceps.  Socket curetted and irrigated with sterile saline.  Manual alveolar compression with gauze 30 seconds. Hemostasis achieved.    Post-op instructions given verbally and on paper.  Patient given gauze.    Rx: None.    Patient dismissed ambulatory and alert.    NV: 150 Exam.    Attending: Dr. Ordonez was present in clinic.

## 2024-08-24 ENCOUNTER — HOSPITAL ENCOUNTER (OUTPATIENT)
Facility: HOSPITAL | Age: 54
Setting detail: OBSERVATION
Discharge: HOME | End: 2024-08-26
Attending: EMERGENCY MEDICINE | Admitting: SURGERY
Payer: COMMERCIAL

## 2024-08-24 ENCOUNTER — ANESTHESIA EVENT (OUTPATIENT)
Dept: PERIOP | Facility: HOSPITAL | Age: 54
End: 2024-08-24
Payer: COMMERCIAL

## 2024-08-24 ENCOUNTER — APPOINTMENT (EMERGENCY)
Dept: CT IMAGING | Facility: HOSPITAL | Age: 54
End: 2024-08-24
Payer: COMMERCIAL

## 2024-08-24 DIAGNOSIS — J30.2 SEASONAL ALLERGIES: ICD-10-CM

## 2024-08-24 DIAGNOSIS — K80.00 ACUTE CHOLECYSTITIS DUE TO BILIARY CALCULUS: ICD-10-CM

## 2024-08-24 DIAGNOSIS — J45.909 ASTHMA, UNSPECIFIED ASTHMA SEVERITY, UNSPECIFIED WHETHER COMPLICATED, UNSPECIFIED WHETHER PERSISTENT: ICD-10-CM

## 2024-08-24 DIAGNOSIS — F32.A DEPRESSION: ICD-10-CM

## 2024-08-24 DIAGNOSIS — K81.0 ACUTE CHOLECYSTITIS: Primary | ICD-10-CM

## 2024-08-24 LAB
ALBUMIN SERPL BCG-MCNC: 4.3 G/DL (ref 3.5–5)
ALP SERPL-CCNC: 171 U/L (ref 34–104)
ALT SERPL W P-5'-P-CCNC: 147 U/L (ref 7–52)
ANION GAP SERPL CALCULATED.3IONS-SCNC: 9 MMOL/L (ref 4–13)
AST SERPL W P-5'-P-CCNC: 393 U/L (ref 13–39)
BASOPHILS # BLD AUTO: 0.02 THOUSANDS/ÂΜL (ref 0–0.1)
BASOPHILS NFR BLD AUTO: 0 % (ref 0–1)
BILIRUB SERPL-MCNC: 2.12 MG/DL (ref 0.2–1)
BILIRUB UR QL STRIP: NEGATIVE
BUN SERPL-MCNC: 8 MG/DL (ref 5–25)
CALCIUM SERPL-MCNC: 9.3 MG/DL (ref 8.4–10.2)
CHLORIDE SERPL-SCNC: 103 MMOL/L (ref 96–108)
CLARITY UR: CLEAR
CO2 SERPL-SCNC: 27 MMOL/L (ref 21–32)
COLOR UR: ABNORMAL
CREAT SERPL-MCNC: 0.52 MG/DL (ref 0.6–1.3)
EOSINOPHIL # BLD AUTO: 0.01 THOUSAND/ÂΜL (ref 0–0.61)
EOSINOPHIL NFR BLD AUTO: 0 % (ref 0–6)
ERYTHROCYTE [DISTWIDTH] IN BLOOD BY AUTOMATED COUNT: 12.2 % (ref 11.6–15.1)
GFR SERPL CREATININE-BSD FRML MDRD: 107 ML/MIN/1.73SQ M
GLUCOSE SERPL-MCNC: 122 MG/DL (ref 65–140)
GLUCOSE UR STRIP-MCNC: NEGATIVE MG/DL
HCT VFR BLD AUTO: 40.2 % (ref 34.8–46.1)
HGB BLD-MCNC: 14 G/DL (ref 11.5–15.4)
HGB UR QL STRIP.AUTO: NEGATIVE
IMM GRANULOCYTES # BLD AUTO: 0.02 THOUSAND/UL (ref 0–0.2)
IMM GRANULOCYTES NFR BLD AUTO: 0 % (ref 0–2)
KETONES UR STRIP-MCNC: ABNORMAL MG/DL
LEUKOCYTE ESTERASE UR QL STRIP: NEGATIVE
LYMPHOCYTES # BLD AUTO: 0.81 THOUSANDS/ÂΜL (ref 0.6–4.47)
LYMPHOCYTES NFR BLD AUTO: 10 % (ref 14–44)
MCH RBC QN AUTO: 30.8 PG (ref 26.8–34.3)
MCHC RBC AUTO-ENTMCNC: 34.8 G/DL (ref 31.4–37.4)
MCV RBC AUTO: 88 FL (ref 82–98)
MONOCYTES # BLD AUTO: 0.55 THOUSAND/ÂΜL (ref 0.17–1.22)
MONOCYTES NFR BLD AUTO: 7 % (ref 4–12)
NEUTROPHILS # BLD AUTO: 7.1 THOUSANDS/ÂΜL (ref 1.85–7.62)
NEUTS SEG NFR BLD AUTO: 83 % (ref 43–75)
NITRITE UR QL STRIP: NEGATIVE
NRBC BLD AUTO-RTO: 0 /100 WBCS
PH UR STRIP.AUTO: 7.5 [PH]
PLATELET # BLD AUTO: 146 THOUSANDS/UL (ref 149–390)
PLATELET # BLD AUTO: 161 THOUSANDS/UL (ref 149–390)
PMV BLD AUTO: 8.8 FL (ref 8.9–12.7)
PMV BLD AUTO: 9.2 FL (ref 8.9–12.7)
POTASSIUM SERPL-SCNC: 3.6 MMOL/L (ref 3.5–5.3)
PROT SERPL-MCNC: 7.3 G/DL (ref 6.4–8.4)
PROT UR STRIP-MCNC: NEGATIVE MG/DL
RBC # BLD AUTO: 4.55 MILLION/UL (ref 3.81–5.12)
SODIUM SERPL-SCNC: 139 MMOL/L (ref 135–147)
SP GR UR STRIP.AUTO: 1.01 (ref 1–1.03)
UROBILINOGEN UR STRIP-ACNC: <2 MG/DL
WBC # BLD AUTO: 8.51 THOUSAND/UL (ref 4.31–10.16)

## 2024-08-24 PROCEDURE — 96374 THER/PROPH/DIAG INJ IV PUSH: CPT

## 2024-08-24 PROCEDURE — 85049 AUTOMATED PLATELET COUNT: CPT | Performed by: SURGERY

## 2024-08-24 PROCEDURE — 74177 CT ABD & PELVIS W/CONTRAST: CPT

## 2024-08-24 PROCEDURE — 85025 COMPLETE CBC W/AUTO DIFF WBC: CPT | Performed by: EMERGENCY MEDICINE

## 2024-08-24 PROCEDURE — 96361 HYDRATE IV INFUSION ADD-ON: CPT

## 2024-08-24 PROCEDURE — 99284 EMERGENCY DEPT VISIT MOD MDM: CPT

## 2024-08-24 PROCEDURE — 80053 COMPREHEN METABOLIC PANEL: CPT | Performed by: EMERGENCY MEDICINE

## 2024-08-24 PROCEDURE — 36415 COLL VENOUS BLD VENIPUNCTURE: CPT | Performed by: EMERGENCY MEDICINE

## 2024-08-24 PROCEDURE — 99285 EMERGENCY DEPT VISIT HI MDM: CPT | Performed by: EMERGENCY MEDICINE

## 2024-08-24 PROCEDURE — 96375 TX/PRO/DX INJ NEW DRUG ADDON: CPT

## 2024-08-24 PROCEDURE — 81003 URINALYSIS AUTO W/O SCOPE: CPT | Performed by: EMERGENCY MEDICINE

## 2024-08-24 RX ORDER — METRONIDAZOLE 500 MG/100ML
500 INJECTION, SOLUTION INTRAVENOUS EVERY 8 HOURS
Status: DISCONTINUED | OUTPATIENT
Start: 2024-08-24 | End: 2024-08-26 | Stop reason: HOSPADM

## 2024-08-24 RX ORDER — PANTOPRAZOLE SODIUM 40 MG/10ML
40 INJECTION, POWDER, LYOPHILIZED, FOR SOLUTION INTRAVENOUS
Status: DISCONTINUED | OUTPATIENT
Start: 2024-08-24 | End: 2024-08-26 | Stop reason: HOSPADM

## 2024-08-24 RX ORDER — HEPARIN SODIUM 5000 [USP'U]/ML
5000 INJECTION, SOLUTION INTRAVENOUS; SUBCUTANEOUS EVERY 8 HOURS SCHEDULED
Status: DISCONTINUED | OUTPATIENT
Start: 2024-08-24 | End: 2024-08-26 | Stop reason: HOSPADM

## 2024-08-24 RX ORDER — CEFAZOLIN SODIUM 1 G/50ML
1000 SOLUTION INTRAVENOUS EVERY 8 HOURS
Status: COMPLETED | OUTPATIENT
Start: 2024-08-24 | End: 2024-08-24

## 2024-08-24 RX ORDER — KETOROLAC TROMETHAMINE 30 MG/ML
15 INJECTION, SOLUTION INTRAMUSCULAR; INTRAVENOUS ONCE
Status: COMPLETED | OUTPATIENT
Start: 2024-08-24 | End: 2024-08-24

## 2024-08-24 RX ORDER — ONDANSETRON 2 MG/ML
4 INJECTION INTRAMUSCULAR; INTRAVENOUS ONCE
Status: COMPLETED | OUTPATIENT
Start: 2024-08-24 | End: 2024-08-24

## 2024-08-24 RX ORDER — DEXTROSE MONOHYDRATE, SODIUM CHLORIDE, AND POTASSIUM CHLORIDE 50; 1.49; 4.5 G/1000ML; G/1000ML; G/1000ML
125 INJECTION, SOLUTION INTRAVENOUS CONTINUOUS
Status: DISCONTINUED | OUTPATIENT
Start: 2024-08-24 | End: 2024-08-25

## 2024-08-24 RX ORDER — ONDANSETRON 2 MG/ML
4 INJECTION INTRAMUSCULAR; INTRAVENOUS EVERY 6 HOURS PRN
Status: DISCONTINUED | OUTPATIENT
Start: 2024-08-24 | End: 2024-08-26 | Stop reason: HOSPADM

## 2024-08-24 RX ADMIN — METRONIDAZOLE 500 MG: 500 INJECTION, SOLUTION INTRAVENOUS at 22:48

## 2024-08-24 RX ADMIN — CEFAZOLIN SODIUM 1000 MG: 1 SOLUTION INTRAVENOUS at 15:14

## 2024-08-24 RX ADMIN — ONDANSETRON 4 MG: 2 INJECTION INTRAMUSCULAR; INTRAVENOUS at 13:44

## 2024-08-24 RX ADMIN — HEPARIN SODIUM 5000 UNITS: 5000 INJECTION INTRAVENOUS; SUBCUTANEOUS at 21:31

## 2024-08-24 RX ADMIN — DEXTROSE, SODIUM CHLORIDE, AND POTASSIUM CHLORIDE 125 ML/HR: 5; .45; .15 INJECTION INTRAVENOUS at 17:16

## 2024-08-24 RX ADMIN — PANTOPRAZOLE SODIUM 40 MG: 40 INJECTION, POWDER, FOR SOLUTION INTRAVENOUS at 15:22

## 2024-08-24 RX ADMIN — KETOROLAC TROMETHAMINE 15 MG: 30 INJECTION, SOLUTION INTRAMUSCULAR; INTRAVENOUS at 13:43

## 2024-08-24 RX ADMIN — SODIUM CHLORIDE 1000 ML: 0.9 INJECTION, SOLUTION INTRAVENOUS at 13:49

## 2024-08-24 RX ADMIN — METRONIDAZOLE 500 MG: 500 INJECTION, SOLUTION INTRAVENOUS at 17:40

## 2024-08-24 RX ADMIN — HEPARIN SODIUM 5000 UNITS: 5000 INJECTION INTRAVENOUS; SUBCUTANEOUS at 15:14

## 2024-08-24 RX ADMIN — IOHEXOL 100 ML: 350 INJECTION, SOLUTION INTRAVENOUS at 14:02
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
## 2024-08-25 ENCOUNTER — ANESTHESIA (OUTPATIENT)
Dept: PERIOP | Facility: HOSPITAL | Age: 54
End: 2024-08-25
Payer: COMMERCIAL

## 2024-08-25 ENCOUNTER — APPOINTMENT (OUTPATIENT)
Dept: RADIOLOGY | Facility: HOSPITAL | Age: 54
End: 2024-08-25
Payer: COMMERCIAL

## 2024-08-25 LAB
ALBUMIN SERPL BCG-MCNC: 3.3 G/DL (ref 3.5–5)
ALP SERPL-CCNC: 172 U/L (ref 34–104)
ALT SERPL W P-5'-P-CCNC: 328 U/L (ref 7–52)
ANION GAP SERPL CALCULATED.3IONS-SCNC: 4 MMOL/L (ref 4–13)
AST SERPL W P-5'-P-CCNC: 422 U/L (ref 13–39)
BILIRUB SERPL-MCNC: 3.76 MG/DL (ref 0.2–1)
BUN SERPL-MCNC: 8 MG/DL (ref 5–25)
CALCIUM ALBUM COR SERPL-MCNC: 8.9 MG/DL (ref 8.3–10.1)
CALCIUM SERPL-MCNC: 8.3 MG/DL (ref 8.4–10.2)
CHLORIDE SERPL-SCNC: 105 MMOL/L (ref 96–108)
CO2 SERPL-SCNC: 28 MMOL/L (ref 21–32)
CREAT SERPL-MCNC: 0.59 MG/DL (ref 0.6–1.3)
GFR SERPL CREATININE-BSD FRML MDRD: 102 ML/MIN/1.73SQ M
GLUCOSE SERPL-MCNC: 102 MG/DL (ref 65–140)
POTASSIUM SERPL-SCNC: 3.7 MMOL/L (ref 3.5–5.3)
PROT SERPL-MCNC: 5.5 G/DL (ref 6.4–8.4)
SODIUM SERPL-SCNC: 137 MMOL/L (ref 135–147)

## 2024-08-25 PROCEDURE — 88304 TISSUE EXAM BY PATHOLOGIST: CPT | Performed by: PATHOLOGY

## 2024-08-25 PROCEDURE — 74300 X-RAY BILE DUCTS/PANCREAS: CPT

## 2024-08-25 PROCEDURE — 47563 LAPARO CHOLECYSTECTOMY/GRAPH: CPT | Performed by: SURGERY

## 2024-08-25 PROCEDURE — 47563 LAPARO CHOLECYSTECTOMY/GRAPH: CPT | Performed by: PHYSICIAN ASSISTANT

## 2024-08-25 PROCEDURE — 80053 COMPREHEN METABOLIC PANEL: CPT | Performed by: SURGERY

## 2024-08-25 PROCEDURE — 99223 1ST HOSP IP/OBS HIGH 75: CPT | Performed by: PHYSICIAN ASSISTANT

## 2024-08-25 RX ORDER — SODIUM CHLORIDE, SODIUM LACTATE, POTASSIUM CHLORIDE, CALCIUM CHLORIDE 600; 310; 30; 20 MG/100ML; MG/100ML; MG/100ML; MG/100ML
INJECTION, SOLUTION INTRAVENOUS CONTINUOUS PRN
Status: DISCONTINUED | OUTPATIENT
Start: 2024-08-25 | End: 2024-08-25

## 2024-08-25 RX ORDER — CEFAZOLIN SODIUM 1 G/3ML
INJECTION, POWDER, FOR SOLUTION INTRAMUSCULAR; INTRAVENOUS AS NEEDED
Status: DISCONTINUED | OUTPATIENT
Start: 2024-08-25 | End: 2024-08-25

## 2024-08-25 RX ORDER — HYDROMORPHONE HCL IN WATER/PF 6 MG/30 ML
0.2 PATIENT CONTROLLED ANALGESIA SYRINGE INTRAVENOUS
Status: DISCONTINUED | OUTPATIENT
Start: 2024-08-25 | End: 2024-08-25 | Stop reason: HOSPADM

## 2024-08-25 RX ORDER — LIDOCAINE HYDROCHLORIDE 20 MG/ML
INJECTION, SOLUTION EPIDURAL; INFILTRATION; INTRACAUDAL; PERINEURAL AS NEEDED
Status: DISCONTINUED | OUTPATIENT
Start: 2024-08-25 | End: 2024-08-25

## 2024-08-25 RX ORDER — MAGNESIUM HYDROXIDE 1200 MG/15ML
LIQUID ORAL AS NEEDED
Status: DISCONTINUED | OUTPATIENT
Start: 2024-08-25 | End: 2024-08-25 | Stop reason: HOSPADM

## 2024-08-25 RX ORDER — BUPIVACAINE HYDROCHLORIDE 2.5 MG/ML
INJECTION, SOLUTION EPIDURAL; INFILTRATION; INTRACAUDAL AS NEEDED
Status: DISCONTINUED | OUTPATIENT
Start: 2024-08-25 | End: 2024-08-25 | Stop reason: HOSPADM

## 2024-08-25 RX ORDER — ALBUTEROL SULFATE 90 UG/1
2 AEROSOL, METERED RESPIRATORY (INHALATION) ONCE
Status: DISCONTINUED | OUTPATIENT
Start: 2024-08-25 | End: 2024-08-25 | Stop reason: HOSPADM

## 2024-08-25 RX ORDER — ROCURONIUM BROMIDE 10 MG/ML
INJECTION, SOLUTION INTRAVENOUS AS NEEDED
Status: DISCONTINUED | OUTPATIENT
Start: 2024-08-25 | End: 2024-08-25

## 2024-08-25 RX ORDER — TRAMADOL HYDROCHLORIDE 50 MG/1
50 TABLET ORAL EVERY 6 HOURS PRN
Status: DISCONTINUED | OUTPATIENT
Start: 2024-08-25 | End: 2024-08-26 | Stop reason: HOSPADM

## 2024-08-25 RX ORDER — DEXAMETHASONE SODIUM PHOSPHATE 10 MG/ML
INJECTION, SOLUTION INTRAMUSCULAR; INTRAVENOUS AS NEEDED
Status: DISCONTINUED | OUTPATIENT
Start: 2024-08-25 | End: 2024-08-25

## 2024-08-25 RX ORDER — FENTANYL CITRATE 50 UG/ML
INJECTION, SOLUTION INTRAMUSCULAR; INTRAVENOUS AS NEEDED
Status: DISCONTINUED | OUTPATIENT
Start: 2024-08-25 | End: 2024-08-25

## 2024-08-25 RX ORDER — HYDROMORPHONE HCL IN WATER/PF 6 MG/30 ML
0.2 PATIENT CONTROLLED ANALGESIA SYRINGE INTRAVENOUS
Status: DISCONTINUED | OUTPATIENT
Start: 2024-08-25 | End: 2024-08-26 | Stop reason: HOSPADM

## 2024-08-25 RX ORDER — MIDAZOLAM HYDROCHLORIDE 2 MG/2ML
INJECTION, SOLUTION INTRAMUSCULAR; INTRAVENOUS AS NEEDED
Status: DISCONTINUED | OUTPATIENT
Start: 2024-08-25 | End: 2024-08-25

## 2024-08-25 RX ORDER — SCOLOPAMINE TRANSDERMAL SYSTEM 1 MG/1
1 PATCH, EXTENDED RELEASE TRANSDERMAL ONCE
Status: DISCONTINUED | OUTPATIENT
Start: 2024-08-25 | End: 2024-08-26 | Stop reason: HOSPADM

## 2024-08-25 RX ORDER — PROPOFOL 10 MG/ML
INJECTION, EMULSION INTRAVENOUS AS NEEDED
Status: DISCONTINUED | OUTPATIENT
Start: 2024-08-25 | End: 2024-08-25

## 2024-08-25 RX ORDER — PHENYLEPHRINE HCL IN 0.9% NACL 1 MG/10 ML
SYRINGE (ML) INTRAVENOUS AS NEEDED
Status: DISCONTINUED | OUTPATIENT
Start: 2024-08-25 | End: 2024-08-25

## 2024-08-25 RX ORDER — OXYCODONE HYDROCHLORIDE 5 MG/1
5 TABLET ORAL EVERY 4 HOURS PRN
Status: DISCONTINUED | OUTPATIENT
Start: 2024-08-25 | End: 2024-08-26 | Stop reason: HOSPADM

## 2024-08-25 RX ADMIN — PROPOFOL 125 MG: 10 INJECTION, EMULSION INTRAVENOUS at 10:17

## 2024-08-25 RX ADMIN — DEXAMETHASONE SODIUM PHOSPHATE 10 MG: 10 INJECTION, SOLUTION INTRAMUSCULAR; INTRAVENOUS at 10:24

## 2024-08-25 RX ADMIN — Medication 100 MCG: at 10:18

## 2024-08-25 RX ADMIN — MIDAZOLAM 2 MG: 1 INJECTION INTRAMUSCULAR; INTRAVENOUS at 10:10

## 2024-08-25 RX ADMIN — METRONIDAZOLE 500 MG: 500 INJECTION, SOLUTION INTRAVENOUS at 16:21

## 2024-08-25 RX ADMIN — DEXMEDETOMIDINE HYDROCHLORIDE 10 MCG: 100 INJECTION, SOLUTION, CONCENTRATE INTRAVENOUS at 10:10

## 2024-08-25 RX ADMIN — METRONIDAZOLE 500 MG: 500 INJECTION, SOLUTION INTRAVENOUS at 05:48

## 2024-08-25 RX ADMIN — SODIUM CHLORIDE, SODIUM LACTATE, POTASSIUM CHLORIDE, AND CALCIUM CHLORIDE: .6; .31; .03; .02 INJECTION, SOLUTION INTRAVENOUS at 10:00

## 2024-08-25 RX ADMIN — ONDANSETRON 4 MG: 2 INJECTION INTRAMUSCULAR; INTRAVENOUS at 10:48

## 2024-08-25 RX ADMIN — Medication 100 MCG: at 10:16

## 2024-08-25 RX ADMIN — OXYCODONE HYDROCHLORIDE 5 MG: 5 TABLET ORAL at 18:28

## 2024-08-25 RX ADMIN — OXYCODONE HYDROCHLORIDE 5 MG: 5 TABLET ORAL at 22:52

## 2024-08-25 RX ADMIN — HEPARIN SODIUM 5000 UNITS: 5000 INJECTION INTRAVENOUS; SUBCUTANEOUS at 16:21

## 2024-08-25 RX ADMIN — METRONIDAZOLE 500 MG: 500 INJECTION, SOLUTION INTRAVENOUS at 22:04

## 2024-08-25 RX ADMIN — FENTANYL CITRATE 50 MCG: 50 INJECTION INTRAMUSCULAR; INTRAVENOUS at 10:15

## 2024-08-25 RX ADMIN — MORPHINE SULFATE 2 MG: 2 INJECTION, SOLUTION INTRAMUSCULAR; INTRAVENOUS at 05:57

## 2024-08-25 RX ADMIN — FENTANYL CITRATE 50 MCG: 50 INJECTION INTRAMUSCULAR; INTRAVENOUS at 10:35

## 2024-08-25 RX ADMIN — HEPARIN SODIUM 5000 UNITS: 5000 INJECTION INTRAVENOUS; SUBCUTANEOUS at 22:05

## 2024-08-25 RX ADMIN — ROCURONIUM BROMIDE 50 MG: 10 INJECTION, SOLUTION INTRAVENOUS at 10:18

## 2024-08-25 RX ADMIN — OXYCODONE HYDROCHLORIDE 5 MG: 5 TABLET ORAL at 12:55

## 2024-08-25 RX ADMIN — SUGAMMADEX 200 MG: 100 INJECTION, SOLUTION INTRAVENOUS at 10:57

## 2024-08-25 RX ADMIN — CEFAZOLIN 2000 MG: 1 INJECTION, POWDER, FOR SOLUTION INTRAMUSCULAR; INTRAVENOUS at 10:23

## 2024-08-25 RX ADMIN — HEPARIN SODIUM 5000 UNITS: 5000 INJECTION INTRAVENOUS; SUBCUTANEOUS at 05:48

## 2024-08-25 RX ADMIN — PANTOPRAZOLE SODIUM 40 MG: 40 INJECTION, POWDER, FOR SOLUTION INTRAVENOUS at 08:42

## 2024-08-25 RX ADMIN — SCOPALAMINE 1 PATCH: 1 PATCH, EXTENDED RELEASE TRANSDERMAL at 12:50

## 2024-08-25 RX ADMIN — LIDOCAINE HYDROCHLORIDE 100 MG: 20 INJECTION, SOLUTION EPIDURAL; INFILTRATION; INTRACAUDAL at 10:16
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
## 2024-08-25 NOTE — CASE MANAGEMENT
Case Management Assessment & Discharge Planning Note    Patient name Albina Dykes  Location /-01 MRN [id number]  : 1970 Date 2024       Current Admission Date: 2024  Current Admission Diagnosis:Acute cholecystitis due to biliary calculus   Patient Active Problem List    Diagnosis Date Noted Date Diagnosed    Acute cholecystitis due to biliary calculus 2024     Difficulty swallowing pills 2023     Seasonal allergies 2023     Mixed hyperlipidemia 2021     Low back pain 2020     Esophageal thickening 2020     Right nephrolithiasis 2020     Lumbar radiculopathy 2020     Herniation of nucleus pulposus of lumbar intervertebral disc with sciatica 2020     Postmenopausal bleeding 2020     Chronic bilateral back pain 2020     History of herniated intervertebral disc 2020     History of musculoskeletal disease 2020     Class 2 obesity with body mass index (BMI) of 36.0 to 36.9 in adult 2020     Obesity 2020     Knee pain 2020     Mixed incontinence 2019     Mixed stress and urge urinary incontinence 2019     Backache 10/11/2012     Herniated nucleus pulposus, L5-S1, right 2012       LOS (days): 0  Geometric Mean LOS (GMLOS) (days):   Days to GMLOS:     OBJECTIVE:              Current admission status: Observation       Preferred Pharmacy:   [facility] [address] [place] [address] - [address] [address]  Phone: [phone] Fax: [phone]    Primary Care Provider: Gildardo Gonzalez    Primary Insurance: [employer]  Secondary Insurance:     ASSESSMENT:  Active Health Care Proxies    There are no active Health Care Proxies on file.       Advance Directives  Does patient have a Health Care POA?: No  Was patient offered paperwork?: Yes (declined)  Does patient currently have a Health Care decision maker?: Yes, please see  Health Care Proxy section  Does patient have Advance Directives?: No  Was patient offered paperwork?: Yes (declined)  Primary Contact: brother Mohinder    Obs Notice Signed:  (not on workqueue)    Readmission Root Cause  30 Day Readmission: No    Patient Information  Admitted from:: Other (comment) (pt is homeless)  Mental Status: Alert  During Assessment patient was accompanied by: Brother (brother Mohinder)  Assessment information provided by:: Patient  Primary Caregiver: Self  Support Systems: Family members  County of Residence: [place]  What city do you live in?: [facility] is her physical address, but pt states that is just where she gets her mail.  She is homeless and living in a tent in a [facility]  Home entry access options. Select all that apply.: No steps to enter home (living in a tent)  Type of Current Residence: Homeless  Living Arrangements: Other (Comment) (Pt is staying in a tent [place] a [place] along with her brother Mohinder who has his own tent)  Is patient a ?: No    Activities of Daily Living Prior to Admission  Functional Status: Independent  Completes ADLs independently?: Yes  Ambulates independently?: Yes  Does patient use assisted devices?: Yes  Assisted Devices (DME) used: Straight Cane  Does patient currently own DME?: Yes  What DME does the patient currently own?: Straight Cane  Does patient have a history of Outpatient Therapy (PT/OT)?: No  Does the patient have a history of Short-Term Rehab?: No  Does patient have a history of HHC?: No  Does patient currently have HHC?: No         Patient Information Continued  Income Source: Employed  Does patient have prescription coverage?: Yes  Does patient receive dialysis treatments?: No  Does patient have a history of substance abuse?: No  Does patient have a history of Mental Health Diagnosis?: Yes (Pt is unsure if her has an actual diagnosis of Bipolar disease, but refused meds for this.)  Is patient receiving treatment for mental  health?: No. Patient declined treatment information.  Has patient received inpatient treatment related to mental health in the last 2 years?: No    PHQ 2/9 Screening   Reviewed PHQ 2/9 Depression Screening Score?: No    Means of Transportation  Means of Transport to Appts:: Drives Self      Social Determinants of Health (SDOH)      Flowsheet Row Most Recent Value   Housing Stability    In the last 12 months, was there a time when you were not able to pay the mortgage or rent on time? Y  [[facility]   In the past 12 months, how many times have you moved where you were living? 20   At any time in the past 12 months, were you homeless or living in a shelter (including now)? Y   Transportation Needs    In the past 12 months, has lack of transportation kept you from medical appointments or from getting medications? no  [Pt owns a car]   In the past 12 months, has lack of transportation kept you from meetings, work, or from getting things needed for daily living? No   Food Insecurity    Within the past 12 months, you worried that your food would run out before you got the money to buy more. Sometimes  [CM supplied info on food charles, community meals, supplemental food programs]   Within the past 12 months, the food you bought just didn't last and you didn't have money to get more. Sometimes   Utilities    In the past 12 months has the electric, gas, oil, or water company threatened to shut off services in your home? Shut off            DISCHARGE DETAILS:    Discharge planning discussed with:: patient and her brother Mohinder  Freedom of Choice: Yes  Comments - Freedom of Choice: Pt explains that she is living in a tent with her autistic brother (who has his own tent) in [place].  CM provided information on [employer] [employer] [place] [address], Homeless Shelters, Emergency Shelters, Supplemental Food Programs, Community Meals, Woman's Resources, Food Pantries, but pt states that she  already is aware of these things.  She cannot leave [place] because her autistic brother Mohinder is court ordered to stay in [place].  He was arrested and is on probation.  Mohinder states that it was a  bogus charge and that people lied.  Pt feels responsible for him and will not leave him.  She does work and gets $525.00 per month, but states that this amt goes towards her car payment and insurance.  CM also supplied info and application for [employer] to see if they would qualify with the psych diagnosis?  CM contacted family/caregiver?: Yes  Were Treatment Team discharge recommendations reviewed with patient/caregiver?: Yes  Did patient/caregiver verbalize understanding of patient care needs?: Yes  Were patient/caregiver advised of the risks associated with not following Treatment Team discharge recommendations?: Yes    Contacts  Patient Contacts: Mohinder  Relationship to Patient:: Family  Contact Method: In Person  Reason/Outcome: Discharge Planning    Requested Home Health Care         Is the patient interested in HHC at discharge?: No    DME Referral Provided  Referral made for DME?: No    Other Referral/Resources/Interventions Provided:  Interventions: [facility]  Referral Comments: Info provided for [facility], Food Pantries, Homeless Shelters, [employer], but pt states she cannot leave [place] because she is responsible for her autistic brother and he is court ordered to stay in [place].  Declining HHC.    Would you like to participate in our [facility] service program?  : No - Declined    Treatment Team Recommendation: Other (pt is living [place] a tent [place])  Discharge Destination Plan:: Other (pt is living [place] a tent [place])  Transport at Discharge : Self

## 2024-08-25 NOTE — OP NOTE
OPERATIVE REPORT  PATIENT NAME: Albina Dykes    :  1970  MRN: [id number]  Pt Location: [facility] OR ROOM 02    SURGERY DATE: 2024    Surgeons and Role:     * Lm Cloud MD - Primary     * Ana Dumont PA-C - Assisting    Preop Diagnosis:  Acute cholecystitis due to biliary calculus [K80.00]    Post-Op Diagnosis Codes:     * Acute cholecystitis due to biliary calculus [K80.00]    Procedure(s):  CHOLECYSTECTOMY LAPAROSCOPIC W/ INTRAOP CHOLANGIOGRAM    Specimen(s):  ID Type Source Tests Collected by Time Destination   1 : GALLBLADDER AND CONTENTS Tissue Gallbladder TISSUE EXAM Lm Cloud MD 2024 1033        Estimated Blood Loss:   Minimal    Drains:  None    Anesthesia Type:   General    Operative Indications:  Acute cholecystitis due to biliary calculus [K80.00]    Operative Findings:  Gallbladder was markedly distended with edema of the wall throughout, small amount of pericholecystic fluid.  Gallbladder contained small gallstones.  Liver surface appeared normal.  Intraoperative cholangiograms with the help of the C arm showed normal bile duct size, no evidence of proximal or distal filling defects, there was narrowing at the right hepatic and common hepatic junction, etiology unknown at this time.  Contrast flowed freely into the small bowel.    Complications:   None    Procedure and Technique:  The patient was identified and the patient was placed in the operating table in a supine position.  After adequate anesthesia induction and satisfactory endotracheal intubation the abdomen was prepped and draped in sterile usual fashion with ChloraPrep.  Timeout was called the patient was identified as postsurgical site.    Abdominal wall was elevated with towel clips, and incision was made through umbilicus and 5 mm trocar was introduced, after verifying the positioning the abdomen was insufflated with CO2.  After obtaining adequate pneumoperitoneum the scope was advanced and exploration was  performed with above findings.    11 mm trocar was placed in the epigastric area, 5 mm trocar in the midclavicular and anterior axillary line, all the trocars were inserted under direct vision.  The fundus of the gallbladder was grasped and pulled towards the right shoulder, the adhesions were carefully taken down using cautery and dissector.    Murguia's pouch was grasped and pulled towards the right side, the cystic duct was identified, dissected and clipped distally, and incision was made over the cystic duct and cholangiogram catheter was inserted into the cystic duct through a separate stab wound and secured with Endo Clip.    Intraoperative cholangiograms were performed with the help of C arm with the above findings.  The cholangiogram catheter was removed and the cystic duct was clipped proximally x2 and then divided with scissors.  Cystic artery was also identified, dissected and the clipped proximally and distally and then divided with scissors.    Gallbladder was removed from the gallbladder fossa using cautery.  Gallbladder was retrieved through the epigastric port site with the help of the Endo Catch.  The abdominal cavity was copiously irrigated with saline solution.  Gallbladder fossa was dry without evidence of bleeding or bile leak.  Cystic duct and cystic artery were reinspected with no evidence of bile leak or bleeding respectively.    Ports were removed under direct vision without evidence of bleeding from abdominal wall.  Epigastric port site fascia was closed with 0 Vicryl interrupted figure-of-eight fashion.  Subcutaneous tissue on all the incisions were infiltrated with 0.25% of Marcaine and the skin was closed with 4-0 Vicryl in an interrupted subcuticular fashion on all the incisions.  At the end of the case instrument, needles, and sponge counts were correct.  Patient tolerated the procedure well.   I was present for the entire procedure., A qualified resident physician was not available.,  and A physician assistant was required during the procedure for retraction, tissue handling, dissection and suturing.    Patient Disposition:  PACU , hemodynamically stable, and extubated and stable        SIGNATURE: Lm Cloud MD  DATE: August 25, 2024  TIME: 10:54 AM

## 2024-08-25 NOTE — ANESTHESIA POSTPROCEDURE EVALUATION
Post-Op Assessment Note    CV Status:  Stable  Pain Score: 0    Pain management: adequate       Mental Status:  Sleepy   Hydration Status:  Stable   PONV Controlled:  None   Airway Patency:  Patent     Post Op Vitals Reviewed: Yes    No anethesia notable event occurred.    Staff: Anesthesiologist           /58 (08/25/24 1117)    Temp 97.8 °F (36.6 °C) (08/25/24 1117)    Pulse 77 (08/25/24 1117)   Resp 13 (08/25/24 1117)    SpO2 91 % (08/25/24 1117)

## 2024-08-25 NOTE — H&P
H&P Exam - General Surgery   Albina Dykes 54 y.o. female MRN: [id number]  Unit/Bed#: -[facility] Encounter: [id number]    Assessment & Plan     Albina Dykes is a 54 y.o. female    Acute cholecystitis  Transaminitis  CT of the abdomen and pelvis showed diffuse gallbladder wall thickening, no calcified stones noted      Plan for OR today for laparoscopic cholecystectomy w/ IOC, possible open. This was discussed with the patient who is agreeable with the plan. Informed consent will be obtained by the surgeon  NPO/IVF for OR today, ok for diet post-operatively pending result of IOC. If there is evidence of a filling defect, will consult GI for ERCP   IV ancef on call to OR for microbial coverage  Trend labs, monitor WBC  Monitor vitals  Pain control PRN  Serial abdominal exams  IS post-operatively  Ambulation/OOB post-operatively  DVT prophylaxis       History of Present Illness     HPI:  Albina Dykes is a 54 y.o. female who presents with right flank pain. She states it started Friday after eating a beef taco and developed acute right sided pain with nausea and emesis which prompted her to seek  further medical attention. She states she has a history of postprandial pain that was typically self limited. CT of the abdomen and pelvis showed diffuse gallbladder wall thickening, no calcified stones were noted, but LFTs were noted to be elevated. Prior surgical history includes diagnostic laparoscopy for fertility purposes, and .      Review of Systems   Constitutional:  Negative for activity change, appetite change, fatigue, fever and unexpected weight change.   HENT:  Negative for congestion, tinnitus and voice change.    Eyes:  Negative for photophobia, discharge and visual disturbance.   Respiratory:  Negative for apnea, chest tightness, shortness of breath, wheezing and stridor.    Cardiovascular:  Negative for chest pain and leg swelling.   Gastrointestinal:  Positive for abdominal pain,  nausea and vomiting. Negative for abdominal distention, constipation and rectal pain.   Endocrine: Negative for cold intolerance, polydipsia, polyphagia and polyuria.   Genitourinary:  Negative for decreased urine volume, difficulty urinating, dysuria, flank pain, hematuria, pelvic pain and urgency.   Musculoskeletal:  Negative for back pain, neck pain and neck stiffness.   Skin:  Negative for color change, pallor, rash and wound.   Neurological:  Negative for dizziness, tremors, syncope and weakness.       Historical Information   Past Medical History:   Diagnosis Date    Heart burn     Kidney stones      Past Surgical History:   Procedure Laterality Date     SECTION       Social History   Social History     Substance and Sexual Activity   Alcohol Use Never     Social History     Substance and Sexual Activity   Drug Use Yes    Types: Marijuana     Social History     Tobacco Use   Smoking Status Never   Smokeless Tobacco Never     Family History:   Family History   Problem Relation Age of Onset    Heart attack Father        Meds/Allergies   PTA meds:   Prior to Admission Medications   Prescriptions Last Dose Informant Patient Reported? Taking?   ALBUTEROL IN  Self Yes No   Sig: Inhale   HYDROcodone-acetaminophen (NORCO) 5-325 mg per tablet Unknown Self Yes No   Sig: Take 1 tablet by mouth every 6 (six) hours as needed for pain   cyclobenzaprine (FLEXERIL) 5 mg tablet Unknown Self Yes No   Sig: Take 5 mg by mouth 3 (three) times a day as needed for muscle spasms   fluticasone (FLONASE) 50 mcg/act nasal spray 2024 at 2100 Self Yes Yes   Si sprays into each nostril daily   ibuprofen (MOTRIN) 600 mg tablet 2024 at 2100  No Yes   Sig: Take 1 tablet (600 mg total) by mouth every 6 (six) hours as needed for mild pain for up to 6 days   loratadine (CLARITIN) 10 mg tablet Unknown Self Yes No   Sig: loratadine 10 mg tablet   TAKE 1 TABLET BY MOUTH ONCE DAILY   loratadine-pseudoephedrine (CLARITIN-D  24-HOUR)  mg per 24 hr tablet Unknown Self Yes No   Sig: Take 1 tablet by mouth daily   ondansetron (ZOFRAN-ODT) 4 mg disintegrating tablet  Self Yes No   Sig: Take 4 mg by mouth every 6 (six) hours as needed for nausea or vomiting   pravastatin (PRAVACHOL) 20 mg tablet 8/23/2024 at 2100 Self Yes Yes   Sig: pravastatin 20 mg tablet   TAKE 1 TABLET BY MOUTH NIGHTLY   pseudoephedrine (SUDAFED) 30 mg tablet Unknown Self Yes No   Sig: Take 60 mg by mouth every 4 (four) hours as needed for congestion      Facility-Administered Medications: None     Allergies   Allergen Reactions    Suture Rash    Codeine     Diphenhydramine Tongue Swelling     Medicated Sleeping Pills    Latex Other (See Comments)    Oxycodone     Other Rash     Added based on information entered during case entry, please review and add reactions, type, and severity as needed       Objective   First Vitals:   Blood Pressure: 121/65 (08/24/24 1257)  Pulse: 96 (08/24/24 1257)  Temperature: 97.8 °F (36.6 °C) (08/24/24 1257)  Temp Source: Temporal (08/24/24 1257)  Respirations: 18 (08/24/24 1257)  Height: 5' (152.4 cm) (08/24/24 1700)  Weight - Scale: 71.3 kg (157 lb 3 oz) (08/24/24 1257)  SpO2: 100 % (08/24/24 1257)    Current Vitals:   Blood Pressure: 95/60 (08/25/24 0748)  Pulse: 89 (08/25/24 0748)  Temperature: 99.8 °F (37.7 °C) (08/25/24 0748)  Temp Source: Temporal (08/24/24 1257)  Respirations: 18 (08/24/24 2232)  Height: 5' (152.4 cm) (08/24/24 1700)  Weight - Scale: 72 kg (158 lb 12.8 oz) (08/24/24 1700)  SpO2: 93 % (08/25/24 0748)      Intake/Output Summary (Last 24 hours) at 8/25/2024 0902  Last data filed at 8/24/2024 1716  Gross per 24 hour   Intake 980 ml   Output --   Net 980 ml       Invasive Devices       Peripheral Intravenous Line  Duration             Peripheral IV 08/24/24 Right Antecubital <1 day                    Physical Exam  Constitutional:       General: She is not in acute distress.     Appearance: She is well-developed.  She is not diaphoretic.   HENT:      Head: Normocephalic and atraumatic.      Right Ear: External ear normal.      Left Ear: External ear normal.      Mouth/Throat:      Mouth: Oropharynx is clear and moist.      Pharynx: No oropharyngeal exudate.   Eyes:      Extraocular Movements: EOM normal.      Conjunctiva/sclera: Conjunctivae normal.      Pupils: Pupils are equal, round, and reactive to light.   Neck:      Thyroid: No thyromegaly.      Trachea: No tracheal deviation.   Cardiovascular:      Rate and Rhythm: Normal rate and regular rhythm.      Heart sounds: Normal heart sounds. No murmur heard.     No friction rub. No gallop.   Pulmonary:      Effort: Pulmonary effort is normal. No respiratory distress.      Breath sounds: Normal breath sounds. No wheezing or rales.   Chest:      Chest wall: No tenderness.   Abdominal:      General: Bowel sounds are normal. There is no distension.      Palpations: Abdomen is soft.      Tenderness: There is abdominal tenderness. There is no guarding or rebound.      Comments: Soft, nondistended, focal tenderness to palpation in the RUQ without guarding or rebound   Musculoskeletal:         General: No tenderness, deformity or edema. Normal range of motion.      Cervical back: Normal range of motion and neck supple.   Skin:     General: Skin is warm and dry.      Coloration: Skin is not pale.      Findings: No erythema or rash.   Neurological:      Mental Status: She is oriented to person, place, and time.      Cranial Nerves: No cranial nerve deficit.      Coordination: Coordination normal.      Deep Tendon Reflexes: Reflexes are normal and symmetric.         Lab Results: I have personally reviewed pertinent lab results.    Recent Results (from the past 36 hour(s))   CBC and differential    Collection Time: 08/24/24  1:25 PM   Result Value Ref Range    WBC 8.51 4.31 - 10.16 Thousand/uL    RBC 4.55 3.81 - 5.12 Million/uL    Hemoglobin 14.0 11.5 - 15.4 g/dL    Hematocrit 40.2 34.8  - 46.1 %    MCV 88 82 - 98 fL    MCH 30.8 26.8 - 34.3 pg    MCHC 34.8 31.4 - 37.4 g/dL    RDW 12.2 11.6 - 15.1 %    MPV 9.2 8.9 - 12.7 fL    Platelets 161 149 - 390 Thousands/uL    nRBC 0 /100 WBCs    Segmented % 83 (H) 43 - 75 %    Immature Grans % 0 0 - 2 %    Lymphocytes % 10 (L) 14 - 44 %    Monocytes % 7 4 - 12 %    Eosinophils Relative 0 0 - 6 %    Basophils Relative 0 0 - 1 %    Absolute Neutrophils 7.10 1.85 - 7.62 Thousands/µL    Absolute Immature Grans 0.02 0.00 - 0.20 Thousand/uL    Absolute Lymphocytes 0.81 0.60 - 4.47 Thousands/µL    Absolute Monocytes 0.55 0.17 - 1.22 Thousand/µL    Eosinophils Absolute 0.01 0.00 - 0.61 Thousand/µL    Basophils Absolute 0.02 0.00 - 0.10 Thousands/µL   Comprehensive metabolic panel    Collection Time: 08/24/24  1:25 PM   Result Value Ref Range    Sodium 139 135 - 147 mmol/L    Potassium 3.6 3.5 - 5.3 mmol/L    Chloride 103 96 - 108 mmol/L    CO2 27 21 - 32 mmol/L    ANION GAP 9 4 - 13 mmol/L    BUN 8 5 - 25 mg/dL    Creatinine 0.52 (L) 0.60 - 1.30 mg/dL    Glucose 122 65 - 140 mg/dL    Calcium 9.3 8.4 - 10.2 mg/dL     (H) 13 - 39 U/L     (H) 7 - 52 U/L    Alkaline Phosphatase 171 (H) 34 - 104 U/L    Total Protein 7.3 6.4 - 8.4 g/dL    Albumin 4.3 3.5 - 5.0 g/dL    Total Bilirubin 2.12 (H) 0.20 - 1.00 mg/dL    eGFR 107 ml/min/1.73sq m   UA w Reflex to Microscopic w Reflex to Culture    Collection Time: 08/24/24  1:25 PM    Specimen: Urine, Clean Catch   Result Value Ref Range    Color, UA Light Yellow     Clarity, UA Clear     Specific Gravity, UA 1.008 1.003 - 1.030    pH, UA 7.5 4.5, 5.0, 5.5, 6.0, 6.5, 7.0, 7.5, 8.0    Leukocytes, UA Negative Negative    Nitrite, UA Negative Negative    Protein, UA Negative Negative mg/dl    Glucose, UA Negative Negative mg/dl    Ketones, UA 10 (1+) (A) Negative mg/dl    Urobilinogen, UA <2.0 <2.0 mg/dl mg/dl    Bilirubin, UA Negative Negative    Occult Blood, UA Negative Negative   Platelet count    Collection Time:  08/24/24  4:50 PM   Result Value Ref Range    Platelets 146 (L) 149 - 390 Thousands/uL    MPV 8.8 (L) 8.9 - 12.7 fL   Comprehensive metabolic panel    Collection Time: 08/25/24  5:27 AM   Result Value Ref Range    Sodium 137 135 - 147 mmol/L    Potassium 3.7 3.5 - 5.3 mmol/L    Chloride 105 96 - 108 mmol/L    CO2 28 21 - 32 mmol/L    ANION GAP 4 4 - 13 mmol/L    BUN 8 5 - 25 mg/dL    Creatinine 0.59 (L) 0.60 - 1.30 mg/dL    Glucose 102 65 - 140 mg/dL    Calcium 8.3 (L) 8.4 - 10.2 mg/dL    Corrected Calcium 8.9 8.3 - 10.1 mg/dL     (H) 13 - 39 U/L     (H) 7 - 52 U/L    Alkaline Phosphatase 172 (H) 34 - 104 U/L    Total Protein 5.5 (L) 6.4 - 8.4 g/dL    Albumin 3.3 (L) 3.5 - 5.0 g/dL    Total Bilirubin 3.76 (H) 0.20 - 1.00 mg/dL    eGFR 102 ml/min/1.73sq m     Imaging: I have personally reviewed pertinent reports.    CT abdomen pelvis with contrast    Result Date: 8/24/2024  Impression: 1.  Diffuse gallbladder wall thickening. No calcified stones or inflammation. Correlate clinically for evidence of acute cholecystitis and suggest further evaluation with right upper quadrant ultrasound. 2.  Mild bladder wall thickening versus prominence from under distention. Correlate for evidence of cystitis. 3.  A 1.8 cm septated left renal cyst, not identified on prior ultrasound. Recommend 6-month follow-up ultrasound. The study was marked in EPIC for immediate notification. Workstation performed: [id number]        EKG, Pathology, and Other Studies: I have personally reviewed pertinent reports.       intact

## 2024-08-25 NOTE — UTILIZATION REVIEW
Initial Clinical Review    Admission: Date/Time/Statement:   Admission Orders (From admission, onward)       Ordered        08/24/24 1442  Place in Observation  Once                          Orders Placed This Encounter   Procedures    Place in Observation     Standing Status:   Standing     Number of Occurrences:   1     Order Specific Question:   Level of Care     Answer:   Med Surg [16]     ED Arrival Information       Expected   -    Arrival   8/24/2024 12:51    Acuity   Urgent              Means of arrival   Walk-In    Escorted by   Family Member    Service   Surgery-General    Admission type   Emergency              Arrival complaint   abdominal pain,flank pain             Chief Complaint   Patient presents with    Abdominal Pain    Flank Pain     Pt c/o of right sided abdominal pain and radiates to the right flank since 9pm last night. Pt has nausea, no vomiting. Pt has hx of a kidney stone.       Initial Presentation: 54 y.o. female presents to ED from home with right flank pain for  past day, developed acute right sided pain, nausea and emesis after eating a  taco.  Has a  H/O post prandial pain, usually slf  limited.  Ct abdomen shows GB  wall thickening, no stones.  LFT's  elevated.  Admit  Observation with  Acute Cholecystitis, Transaminitis and plan is  IVF, Npo and operative intervention.    SURGERY DATE: 8/25/2024   Procedure(s):  CHOLECYSTECTOMY LAPAROSCOPIC W/ INTRAOP CHOLANGIOGRAM    Operative Findings:  Gallbladder was markedly distended with edema of the wall throughout, small amount of pericholecystic fluid.  Gallbladder contained small gallstones.  Liver surface appeared normal.  Intraoperative cholangiograms with the help of the C arm showed normal bile duct size, no evidence of proximal or distal filling defects, there was narrowing at the right hepatic and common hepatic junction, etiology unknown at this time.  Contrast flowed freely into the small bowel.      ED Triage Vitals   Temperature  Pulse Respirations Blood Pressure SpO2 Pain Score   08/24/24 1257 08/24/24 1257 08/24/24 1257 08/24/24 1257 08/24/24 1257 08/24/24 1548   97.8 °F (36.6 °C) 96 18 121/65 100 % No Pain     Weight (last 2 days)       Date/Time Weight    08/24/24 1700 72 (158.8)    08/24/24 1257 71.3 (157.19)            Vital Signs (last 3 days)       Date/Time Temp Pulse Resp BP MAP (mmHg) SpO2 Calculated FIO2 (%) - Nasal Cannula Nasal Cannula O2 Flow Rate (L/min) O2 Device Cardiac (WDL) Patient Position - Orthostatic VS Pain    08/25/24 1145 -- 80 14 108/60 -- 97 % -- -- None (Room air) WDL -- No Pain    08/25/24 1130 -- 82 14 115/64 84 98 % 28 2 L/min Nasal cannula WDL -- No Pain    08/25/24 1117 97.8 °F (36.6 °C) 77 13 105/58 -- 91 % 28 2 L/min Nasal cannula -- -- --    08/25/24 1115 -- -- 14 105/58 -- -- 28 2 L/min Nasal cannula WDL -- No Pain    08/25/24 1112 97.8 °F (36.6 °C) 89 -- 105/57 76 97 % 28 2 L/min Nasal cannula WDL -- No Pain    08/25/24 0802 -- -- -- -- -- -- -- -- None (Room air) -- -- No Pain    08/25/24 07:48:34 99.8 °F (37.7 °C) 89 -- 95/60 72 93 % -- -- -- -- -- --    08/25/24 0557 -- -- -- -- -- -- -- -- -- -- -- 8    08/24/24 22:32:29 99.6 °F (37.6 °C) 89 18 95/61 72 96 % -- -- -- -- -- --    08/24/24 1930 -- -- -- -- -- -- -- -- None (Room air) -- -- No Pain    08/24/24 15:48:56 99 °F (37.2 °C) 80 17 98/63 75 97 % -- -- None (Room air) -- -- No Pain    08/24/24 1526 -- 86 18 105/62 -- 99 % -- -- None (Room air) -- Lying --    08/24/24 1257 97.8 °F (36.6 °C) 96 18 121/65 -- 100 % -- -- None (Room air) -- Sitting --              Pertinent Labs/Diagnostic Test Results:   Radiology:  CT abdomen pelvis with contrast   Final Interpretation by Sumit Mendez MD (08/24 0670)      1.  Diffuse gallbladder wall thickening. No calcified stones or inflammation. Correlate clinically for evidence of acute cholecystitis and suggest further evaluation with right upper quadrant ultrasound.   2.  Mild bladder wall thickening  versus prominence from under distention. Correlate for evidence of cystitis.   3.  A 1.8 cm septated left renal cyst, not identified on prior ultrasound. Recommend 6-month follow-up ultrasound.         The study was marked in EPIC for immediate notification.         Workstation performed: [id number]         XR cholangiogram intraoperative    (Results Pending)           Results from last 7 days   Lab Units 08/24/24  1650 08/24/24  1325   WBC Thousand/uL  --  8.51   HEMOGLOBIN g/dL  --  14.0   HEMATOCRIT %  --  40.2   PLATELETS Thousands/uL 146* 161   TOTAL NEUT ABS Thousands/µL  --  7.10         Results from last 7 days   Lab Units 08/25/24  0527 08/24/24  1325   SODIUM mmol/L 137 139   POTASSIUM mmol/L 3.7 3.6   CHLORIDE mmol/L 105 103   CO2 mmol/L 28 27   ANION GAP mmol/L 4 9   BUN mg/dL 8 8   CREATININE mg/dL 0.59* 0.52*   EGFR ml/min/1.73sq m 102 107   CALCIUM mg/dL 8.3* 9.3     Results from last 7 days   Lab Units 08/25/24  0527 08/24/24  1325   AST U/L 422* 393*   ALT U/L 328* 147*   ALK PHOS U/L 172* 171*   TOTAL PROTEIN g/dL 5.5* 7.3   ALBUMIN g/dL 3.3* 4.3   TOTAL BILIRUBIN mg/dL 3.76* 2.12*         Results from last 7 days   Lab Units 08/25/24  0527 08/24/24  1325   GLUCOSE RANDOM mg/dL 102 122           Results from last 7 days   Lab Units 08/24/24  1325   CLARITY UA  Clear   COLOR UA  Light Yellow   SPEC GRAV UA  1.008   PH UA  7.5   GLUCOSE UA mg/dl Negative   KETONES UA mg/dl 10 (1+)*   BLOOD UA  Negative   PROTEIN UA mg/dl Negative   NITRITE UA  Negative   BILIRUBIN UA  Negative   UROBILINOGEN UA (BE) mg/dl <2.0   LEUKOCYTES UA  Negative             ED Treatment-Medication Administration from 08/24/2024 1251 to 08/24/2024 1538         Date/Time Order Dose Route Action     08/24/2024 1344 ondansetron (ZOFRAN) injection 4 mg 4 mg Intravenous Given     08/24/2024 1343 ketorolac (TORADOL) injection 15 mg 15 mg Intravenous Given     08/24/2024 1349 sodium chloride 0.9 % bolus 1,000 mL 1,000 mL Intravenous  New Bag     08/24/2024 1402 iohexol (OMNIPAQUE) 350 MG/ML injection (MULTI-DOSE) 100 mL 100 mL Intravenous Given     08/24/2024 1514 heparin (porcine) subcutaneous injection 5,000 Units 5,000 Units Subcutaneous Given     08/24/2024 1514 ceFAZolin (ANCEF) IVPB (premix in dextrose) 1,000 mg 50 mL 1,000 mg Intravenous New Bag     08/24/2024 1522 pantoprazole (PROTONIX) injection 40 mg 40 mg Intravenous Given              Present on Admission:   Acute cholecystitis due to biliary calculus      Admitting Diagnosis: Acute cholecystitis [K81.0]  Seasonal allergies [J30.2]  Abdominal pain [R10.9]  Acute cholecystitis due to biliary calculus [K80.00]  Asthma, unspecified asthma severity, unspecified whether complicated, unspecified whether persistent [J45.909]  Age/Sex: 54 y.o. female  Admission Orders:  Scheduled Medications:  heparin (porcine), 5,000 Units, Subcutaneous, Q8H LI  metroNIDAZOLE, 500 mg, Intravenous, Q8H  pantoprazole, 40 mg, Intravenous, Q24H LI  scopolamine, 1 patch, Transdermal, Once      Continuous IV Infusions:     PRN Meds:  HYDROmorphone, 0.2 mg, Intravenous, Q3H PRN  lactated ringers, 1,000 mL, Intravenous, Once PRN   And  lactated ringers, 1,000 mL, Intravenous, Once PRN  ondansetron, 4 mg, Intravenous, Q6H PRN  oxyCODONE, 5 mg, Oral, Q4H PRN  sodium chloride, 1,000 mL, Intravenous, Once PRN   And  sodium chloride, 1,000 mL, Intravenous, Once PRN  traMADol, 50 mg, Oral, Q6H PRN        IP CONSULT TO INTERNAL MEDICINE  IP CONSULT TO CASE MANAGEMENT    Network Utilization Review Department  ATTENTION: Please call with any questions or concerns to [phone] and carefully listen to the prompts so that you are directed to the right person. All voicemails are confidential.   For Discharge needs, contact Care Management DC Support Team at [phone] opt. 2  Send all requests for admission clinical reviews, approved or denied determinations and any other requests to dedicated fax number below belonging  to the campus where the patient is receiving treatment. List of dedicated fax numbers for the Facilities:  FACILITY NAME UR FAX NUMBER   ADMISSION DENIALS (Administrative/Medical Necessity) [phone]   DISCHARGE SUPPORT TEAM (NETWORK) [phone]   PARENT CHILD HEALTH (Maternity/NICU/Pediatrics) [phone]   [facility] [phone]   General acute hospital [phone]   [facility] [phone]   [facility] [phone]   Count includes the [facility] [phone]   [facility] [phone]   [facility] [phone]   [facility] [phone]   [facility] [phone]   [facility] [phone]   [facility] [phone]   [facility] [phone]

## 2024-08-26 VITALS
OXYGEN SATURATION: 96 % | RESPIRATION RATE: 15 BRPM | DIASTOLIC BLOOD PRESSURE: 63 MMHG | HEART RATE: 45 BPM | WEIGHT: 158.8 LBS | BODY MASS INDEX: 31.18 KG/M2 | SYSTOLIC BLOOD PRESSURE: 103 MMHG | TEMPERATURE: 97.9 F | HEIGHT: 60 IN

## 2024-08-26 LAB
ALBUMIN SERPL BCG-MCNC: 3.5 G/DL (ref 3.5–5)
ALP SERPL-CCNC: 162 U/L (ref 34–104)
ALT SERPL W P-5'-P-CCNC: 208 U/L (ref 7–52)
ANION GAP SERPL CALCULATED.3IONS-SCNC: 7 MMOL/L (ref 4–13)
AST SERPL W P-5'-P-CCNC: 113 U/L (ref 13–39)
BASOPHILS # BLD AUTO: 0.01 THOUSANDS/ÂΜL (ref 0–0.1)
BASOPHILS NFR BLD AUTO: 0 % (ref 0–1)
BILIRUB SERPL-MCNC: 0.9 MG/DL (ref 0.2–1)
BUN SERPL-MCNC: 10 MG/DL (ref 5–25)
CALCIUM SERPL-MCNC: 8.9 MG/DL (ref 8.4–10.2)
CHLORIDE SERPL-SCNC: 106 MMOL/L (ref 96–108)
CO2 SERPL-SCNC: 26 MMOL/L (ref 21–32)
CREAT SERPL-MCNC: 0.51 MG/DL (ref 0.6–1.3)
EOSINOPHIL # BLD AUTO: 0 THOUSAND/ÂΜL (ref 0–0.61)
EOSINOPHIL NFR BLD AUTO: 0 % (ref 0–6)
ERYTHROCYTE [DISTWIDTH] IN BLOOD BY AUTOMATED COUNT: 12.6 % (ref 11.6–15.1)
GFR SERPL CREATININE-BSD FRML MDRD: 107 ML/MIN/1.73SQ M
GLUCOSE SERPL-MCNC: 145 MG/DL (ref 65–140)
HCT VFR BLD AUTO: 32 % (ref 34.8–46.1)
HGB BLD-MCNC: 11 G/DL (ref 11.5–15.4)
IMM GRANULOCYTES # BLD AUTO: 0.06 THOUSAND/UL (ref 0–0.2)
IMM GRANULOCYTES NFR BLD AUTO: 1 % (ref 0–2)
LYMPHOCYTES # BLD AUTO: 0.62 THOUSANDS/ÂΜL (ref 0.6–4.47)
LYMPHOCYTES NFR BLD AUTO: 9 % (ref 14–44)
MCH RBC QN AUTO: 30.7 PG (ref 26.8–34.3)
MCHC RBC AUTO-ENTMCNC: 34.4 G/DL (ref 31.4–37.4)
MCV RBC AUTO: 89 FL (ref 82–98)
MONOCYTES # BLD AUTO: 0.44 THOUSAND/ÂΜL (ref 0.17–1.22)
MONOCYTES NFR BLD AUTO: 7 % (ref 4–12)
NEUTROPHILS # BLD AUTO: 5.49 THOUSANDS/ÂΜL (ref 1.85–7.62)
NEUTS SEG NFR BLD AUTO: 83 % (ref 43–75)
NRBC BLD AUTO-RTO: 0 /100 WBCS
PLATELET # BLD AUTO: 128 THOUSANDS/UL (ref 149–390)
PMV BLD AUTO: 9.4 FL (ref 8.9–12.7)
POTASSIUM SERPL-SCNC: 3.8 MMOL/L (ref 3.5–5.3)
PROT SERPL-MCNC: 6.1 G/DL (ref 6.4–8.4)
RBC # BLD AUTO: 3.58 MILLION/UL (ref 3.81–5.12)
SODIUM SERPL-SCNC: 139 MMOL/L (ref 135–147)
WBC # BLD AUTO: 6.62 THOUSAND/UL (ref 4.31–10.16)

## 2024-08-26 PROCEDURE — 99024 POSTOP FOLLOW-UP VISIT: CPT | Performed by: PHYSICIAN ASSISTANT

## 2024-08-26 PROCEDURE — 99243 OFF/OP CNSLTJ NEW/EST LOW 30: CPT | Performed by: INTERNAL MEDICINE

## 2024-08-26 PROCEDURE — 80053 COMPREHEN METABOLIC PANEL: CPT | Performed by: PHYSICIAN ASSISTANT

## 2024-08-26 PROCEDURE — 85025 COMPLETE CBC W/AUTO DIFF WBC: CPT | Performed by: PHYSICIAN ASSISTANT

## 2024-08-26 RX ORDER — TRAMADOL HYDROCHLORIDE 50 MG/1
50 TABLET ORAL EVERY 6 HOURS PRN
Qty: 12 TABLET | Refills: 0 | Status: SHIPPED | OUTPATIENT
Start: 2024-08-26 | End: 2024-08-29

## 2024-08-26 RX ADMIN — PANTOPRAZOLE SODIUM 40 MG: 40 INJECTION, POWDER, FOR SOLUTION INTRAVENOUS at 09:17

## 2024-08-26 RX ADMIN — TRAMADOL HYDROCHLORIDE 50 MG: 50 TABLET, COATED ORAL at 05:08

## 2024-08-26 RX ADMIN — HEPARIN SODIUM 5000 UNITS: 5000 INJECTION INTRAVENOUS; SUBCUTANEOUS at 05:08

## 2024-08-26 RX ADMIN — METRONIDAZOLE 500 MG: 500 INJECTION, SOLUTION INTRAVENOUS at 06:20

## 2024-08-26 NOTE — PROGRESS NOTES
Progress Note - General Surgery   Albina Dykes 54 y.o. female MRN: [id number]  Unit/Bed#: -01 Encounter: [id number]    Assessment/Plan  Albina Dykes is a 54 y.o. female     POD1 s/p laparoscopic cholecystectomy w/ IOC  AVSS, WBC 6.62, Hb 11.0  Cr 0.51  LFTs downtrending, IOC negative for filling defect, of note, there appears to be a narrowing of the hepatic duct   + flatus/BM, abdomen soft, nondistended, normoactive bowel sounds, incision c/d/I covered with dressing, no surrounding erythema or edema, appropriate incisional tenderness to palpation, no guarding or rebound, no peritoneal signs       The patient is doing well from surgical standpoint, ok for discharge home today. Patient will require follow up in 2 weeks with Pedro Luis for routine postoperative check. All discharge instructions were reviewed with the patient prior to discharge. All questions and concerns were addressed prior to discharge.   IOC findings were discussed with the on call Gastroenterologist yesterday. Per recommendations, patient will require outpatient follow up to Gastroenterology and monitoring of LFTs at this time. No further intervention is recommended     Subjective/Objective    Subjective: No acute events overnight     Objective:     Blood pressure 103/63, pulse (!) 45, temperature 97.9 °F (36.6 °C), resp. rate 15, height 5' (1.524 m), weight 72 kg (158 lb 12.8 oz), SpO2 96%.,Body mass index is 31.01 kg/m².      Intake/Output Summary (Last 24 hours) at 8/26/2024 1029  Last data filed at 8/26/2024 0716  Gross per 24 hour   Intake 240 ml   Output --   Net 240 ml       Invasive Devices       Peripheral Intravenous Line  Duration             Peripheral IV 08/24/24 Right Antecubital 1 day                    Physical Exam: /63   Pulse (!) 45   Temp 97.9 °F (36.6 °C)   Resp 15   Ht 5' (1.524 m)   Wt 72 kg (158 lb 12.8 oz)   SpO2 96%   BMI 31.01 kg/m²   General appearance: alert and oriented, in no acute  distress  Lungs: clear to auscultation bilaterally  Heart: regular rate and rhythm, S1, S2 normal, no murmur, click, rub or gallop  Abdomen: abdomen soft, nondistended, normoactive bowel sounds, incision c/d/I covered with dressing, no surrounding erythema or edema, appropriate incisional tenderness to palpation, no guarding or rebound, no peritoneal signs   Extremities: extremities normal, warm and well-perfused; no cyanosis, clubbing, or edema    Lab, Imaging and other studies:I have personally reviewed pertinent lab results.     VTE Pharmacologic Prophylaxis: Heparin  VTE Mechanical Prophylaxis: sequential compression device    Recent Results (from the past 36 hour(s))   Comprehensive metabolic panel    Collection Time: 08/25/24  5:27 AM   Result Value Ref Range    Sodium 137 135 - 147 mmol/L    Potassium 3.7 3.5 - 5.3 mmol/L    Chloride 105 96 - 108 mmol/L    CO2 28 21 - 32 mmol/L    ANION GAP 4 4 - 13 mmol/L    BUN 8 5 - 25 mg/dL    Creatinine 0.59 (L) 0.60 - 1.30 mg/dL    Glucose 102 65 - 140 mg/dL    Calcium 8.3 (L) 8.4 - 10.2 mg/dL    Corrected Calcium 8.9 8.3 - 10.1 mg/dL     (H) 13 - 39 U/L     (H) 7 - 52 U/L    Alkaline Phosphatase 172 (H) 34 - 104 U/L    Total Protein 5.5 (L) 6.4 - 8.4 g/dL    Albumin 3.3 (L) 3.5 - 5.0 g/dL    Total Bilirubin 3.76 (H) 0.20 - 1.00 mg/dL    eGFR 102 ml/min/1.73sq m   Comprehensive metabolic panel    Collection Time: 08/26/24  4:58 AM   Result Value Ref Range    Sodium 139 135 - 147 mmol/L    Potassium 3.8 3.5 - 5.3 mmol/L    Chloride 106 96 - 108 mmol/L    CO2 26 21 - 32 mmol/L    ANION GAP 7 4 - 13 mmol/L    BUN 10 5 - 25 mg/dL    Creatinine 0.51 (L) 0.60 - 1.30 mg/dL    Glucose 145 (H) 65 - 140 mg/dL    Calcium 8.9 8.4 - 10.2 mg/dL     (H) 13 - 39 U/L     (H) 7 - 52 U/L    Alkaline Phosphatase 162 (H) 34 - 104 U/L    Total Protein 6.1 (L) 6.4 - 8.4 g/dL    Albumin 3.5 3.5 - 5.0 g/dL    Total Bilirubin 0.90 0.20 - 1.00 mg/dL    eGFR 107  ml/min/1.73sq m   CBC and differential    Collection Time: 08/26/24  4:58 AM   Result Value Ref Range    WBC 6.62 4.31 - 10.16 Thousand/uL    RBC 3.58 (L) 3.81 - 5.12 Million/uL    Hemoglobin 11.0 (L) 11.5 - 15.4 g/dL    Hematocrit 32.0 (L) 34.8 - 46.1 %    MCV 89 82 - 98 fL    MCH 30.7 26.8 - 34.3 pg    MCHC 34.4 31.4 - 37.4 g/dL    RDW 12.6 11.6 - 15.1 %    MPV 9.4 8.9 - 12.7 fL    Platelets 128 (L) 149 - 390 Thousands/uL    nRBC 0 /100 WBCs    Segmented % 83 (H) 43 - 75 %    Immature Grans % 1 0 - 2 %    Lymphocytes % 9 (L) 14 - 44 %    Monocytes % 7 4 - 12 %    Eosinophils Relative 0 0 - 6 %    Basophils Relative 0 0 - 1 %    Absolute Neutrophils 5.49 1.85 - 7.62 Thousands/µL    Absolute Immature Grans 0.06 0.00 - 0.20 Thousand/uL    Absolute Lymphocytes 0.62 0.60 - 4.47 Thousands/µL    Absolute Monocytes 0.44 0.17 - 1.22 Thousand/µL    Eosinophils Absolute 0.00 0.00 - 0.61 Thousand/µL    Basophils Absolute 0.01 0.00 - 0.10 Thousands/µL

## 2024-08-26 NOTE — PLAN OF CARE
Problem: PAIN - ADULT  Goal: Verbalizes/displays adequate comfort level or baseline comfort level  Description: Interventions:  - Encourage patient to monitor pain and request assistance  - Assess pain using appropriate pain scale  - Administer analgesics based on type and severity of pain and evaluate response  - Implement non-pharmacological measures as appropriate and evaluate response  - Consider cultural and social influences on pain and pain management  - Notify physician/advanced practitioner if interventions unsuccessful or patient reports new pain  Outcome: Progressing     Problem: GASTROINTESTINAL - ADULT  Goal: Minimal or absence of nausea and/or vomiting  Description: INTERVENTIONS:  - Administer IV fluids if ordered to ensure adequate hydration  - Maintain NPO status until nausea and vomiting are resolved  - Nasogastric tube if ordered  - Administer ordered antiemetic medications as needed  - Provide nonpharmacologic comfort measures as appropriate  - Advance diet as tolerated, if ordered  - Consider nutrition services referral to assist patient with adequate nutrition and appropriate food choices  Outcome: Progressing  Goal: Maintains or returns to baseline bowel function  Description: INTERVENTIONS:  - Assess bowel function  - Encourage oral fluids to ensure adequate hydration  - Administer IV fluids if ordered to ensure adequate hydration  - Administer ordered medications as needed  - Encourage mobilization and activity  - Consider nutritional services referral to assist patient with adequate nutrition and appropriate food choices  Outcome: Progressing  Goal: Maintains adequate nutritional intake  Description: INTERVENTIONS:  - Monitor percentage of each meal consumed  - Identify factors contributing to decreased intake, treat as appropriate  - Assist with meals as needed  - Monitor I&O, weight, and lab values if indicated  - Obtain nutrition services referral as needed  Outcome: Progressing

## 2024-08-26 NOTE — CONSULTS
[facility]  Consult  Name: Albina Dykes 54 y.o. female I MRN: [id number]  Unit/Bed#: -01 I Date of Admission: 8/24/2024   Date of Service: 8/26/2024 I Hospital Day: 0    Inpatient consult to Internal Medicine  Consult performed by: Da Oliva MD  Consult ordered by: Lm Cloud MD          Assessment & Plan   * Acute cholecystitis due to biliary calculus  Assessment & Plan  Underwent a cholecystectomy for acute cholecystitis yesterday  Being managed primarily by general surgery  Currently on full diet  Management per surgical team    Mixed hyperlipidemia  Assessment & Plan  Can resume statin once cleared by general surgery             VTE Prophylaxis: Fondaparinux (Arixtra) and Heparin  / foot pump applied     Recommendations for Discharge:  Medically cleared from medical standpoint  Counseling / Coordination of Care Time: 45 minutes.  Greater than 50% of total time spent on patient counseling and coordination of care.    Collaboration of Care: Were Recommendations Directly Discussed with Primary Treatment Team? - Yes     History of Present Illness:    Albina Dykes is a 54 y.o. female who is originally admitted to the general surgery service due to acute cholecystitis. We are consulted for management of chronic conditions including hyperlipidemia.  Currently denies any acute complaints.  Denies fevers, chills, cough, shortness of breath chest pain    Review of Systems:    Review of Systems   Constitutional:  Negative for activity change, appetite change, chills, diaphoresis, fever and unexpected weight change.   HENT:  Negative for congestion, facial swelling and rhinorrhea.    Eyes:  Negative for photophobia and visual disturbance.   Respiratory:  Negative for cough, shortness of breath and wheezing.    Cardiovascular:  Negative for chest pain and palpitations.   Gastrointestinal:  Positive for abdominal pain. Negative for blood in stool, constipation, diarrhea,  nausea and vomiting.   Genitourinary:  Negative for decreased urine volume, difficulty urinating, dysuria, flank pain, frequency, hematuria and urgency.   Musculoskeletal:  Negative for arthralgias, back pain, joint swelling and myalgias.   Neurological:  Negative for dizziness, syncope, facial asymmetry, light-headedness, numbness and headaches.   Psychiatric/Behavioral:  Negative for confusion and decreased concentration. The patient is not nervous/anxious.        Past Medical and Surgical History:     Past Medical History:   Diagnosis Date    Heart burn     Kidney stones        Past Surgical History:   Procedure Laterality Date     SECTION      CHOLECYSTECTOMY LAPAROSCOPIC N/A 2024    Procedure: CHOLECYSTECTOMY LAPAROSCOPIC W/ INTRAOP CHOLANGIOGRAM;  Surgeon: Lm Cloud MD;  Location: [facility];  Service: General       Meds/Allergies:    PTA meds:   Prior to Admission Medications   Prescriptions Last Dose Informant Patient Reported? Taking?   ALBUTEROL IN  Self Yes No   Sig: Inhale   HYDROcodone-acetaminophen (NORCO) 5-325 mg per tablet Unknown Self Yes No   Sig: Take 1 tablet by mouth every 6 (six) hours as needed for pain   cyclobenzaprine (FLEXERIL) 5 mg tablet Unknown Self Yes No   Sig: Take 5 mg by mouth 3 (three) times a day as needed for muscle spasms   fluticasone (FLONASE) 50 mcg/act nasal spray 2024 at 2100 Self Yes Yes   Si sprays into each nostril daily   ibuprofen (MOTRIN) 600 mg tablet 2024 at 2100  No Yes   Sig: Take 1 tablet (600 mg total) by mouth every 6 (six) hours as needed for mild pain for up to 6 days   loratadine (CLARITIN) 10 mg tablet Unknown Self Yes No   Sig: loratadine 10 mg tablet   TAKE 1 TABLET BY MOUTH ONCE DAILY   loratadine-pseudoephedrine (CLARITIN-D 24-HOUR)  mg per 24 hr tablet Unknown Self Yes No   Sig: Take 1 tablet by mouth daily   ondansetron (ZOFRAN-ODT) 4 mg disintegrating tablet  Self Yes No   Sig: Take 4 mg by mouth every 6 (six)  hours as needed for nausea or vomiting   pravastatin (PRAVACHOL) 20 mg tablet 8/23/2024 at 2100 Self Yes Yes   Sig: pravastatin 20 mg tablet   TAKE 1 TABLET BY MOUTH NIGHTLY   pseudoephedrine (SUDAFED) 30 mg tablet Unknown Self Yes No   Sig: Take 60 mg by mouth every 4 (four) hours as needed for congestion      Facility-Administered Medications: None       Allergies:   Allergies   Allergen Reactions    Suture Rash    Codeine     Diphenhydramine Tongue Swelling     Medicated Sleeping Pills    Latex Other (See Comments)    Oxycodone     Other Rash     Added based on information entered during case entry, please review and add reactions, type, and severity as needed       Social History:     Marital Status:     Substance Use History:   Social History     Substance and Sexual Activity   Alcohol Use Never     Social History     Tobacco Use   Smoking Status Never   Smokeless Tobacco Never     Social History     Substance and Sexual Activity   Drug Use Yes    Types: Marijuana       Family History:    Family History   Problem Relation Age of Onset    Heart attack Father        Physical Exam:     Vitals:   Blood Pressure: 103/63 (08/26/24 0716)  Pulse: (!) 45 (08/26/24 0716)  Temperature: 97.9 °F (36.6 °C) (08/26/24 0716)  Temp Source: Oral (08/25/24 2305)  Respirations: 15 (08/25/24 2305)  Height: 5' (152.4 cm) (08/24/24 1700)  Weight - Scale: 72 kg (158 lb 12.8 oz) (08/24/24 1700)  SpO2: 96 % (08/26/24 0716)    Physical Exam  Constitutional:       General: She is not in acute distress.     Appearance: She is well-developed. She is not diaphoretic.   HENT:      Head: Normocephalic and atraumatic.      Nose: Nose normal.      Mouth/Throat:      Mouth: Oropharynx is clear and moist.      Pharynx: No oropharyngeal exudate.   Eyes:      General: No scleral icterus.        Right eye: No discharge.         Left eye: No discharge.      Extraocular Movements: EOM normal.      Conjunctiva/sclera: Conjunctivae normal.   Neck:       Thyroid: No thyromegaly.      Vascular: No JVD.   Cardiovascular:      Rate and Rhythm: Normal rate and regular rhythm.      Heart sounds: Normal heart sounds. No murmur heard.     No friction rub. No gallop.   Pulmonary:      Effort: Pulmonary effort is normal. No respiratory distress.      Breath sounds: Normal breath sounds. No wheezing or rales.   Chest:      Chest wall: No tenderness.   Abdominal:      General: Bowel sounds are normal. There is no distension.      Palpations: Abdomen is soft.      Tenderness: There is no abdominal tenderness. There is no guarding or rebound.   Musculoskeletal:         General: No tenderness, deformity or edema. Normal range of motion.      Cervical back: Normal range of motion and neck supple.   Skin:     General: Skin is warm and dry.      Findings: No erythema or rash.   Neurological:      Mental Status: She is alert. Mental status is at baseline.      Cranial Nerves: No cranial nerve deficit.      Sensory: No sensory deficit.      Motor: No abnormal muscle tone.      Coordination: Coordination normal.   Psychiatric:         Mood and Affect: Mood and affect normal.         (     Additional Data:     Lab Results: I have personally reviewed pertinent reports.      Results from last 7 days   Lab Units 08/26/24  0458   WBC Thousand/uL 6.62   HEMOGLOBIN g/dL 11.0*   HEMATOCRIT % 32.0*   PLATELETS Thousands/uL 128*   SEGS PCT % 83*   LYMPHO PCT % 9*   MONO PCT % 7   EOS PCT % 0     Results from last 7 days   Lab Units 08/26/24  0458   SODIUM mmol/L 139   POTASSIUM mmol/L 3.8   CHLORIDE mmol/L 106   CO2 mmol/L 26   BUN mg/dL 10   CREATININE mg/dL 0.51*   ANION GAP mmol/L 7   CALCIUM mg/dL 8.9   ALBUMIN g/dL 3.5   TOTAL BILIRUBIN mg/dL 0.90   ALK PHOS U/L 162*   ALT U/L 208*   AST U/L 113*   GLUCOSE RANDOM mg/dL 145*             Lab Results   Component Value Date/Time    HGBA1C 5.4 04/21/2021 10:51 AM    HGBA1C 5.2 10/24/2020 10:58 AM               Imaging: I have personally  reviewed pertinent reports.      CT abdomen pelvis with contrast   Final Result by Sumit Mendez MD (08/24 1543)      1.  Diffuse gallbladder wall thickening. No calcified stones or inflammation. Correlate clinically for evidence of acute cholecystitis and suggest further evaluation with right upper quadrant ultrasound.   2.  Mild bladder wall thickening versus prominence from under distention. Correlate for evidence of cystitis.   3.  A 1.8 cm septated left renal cyst, not identified on prior ultrasound. Recommend 6-month follow-up ultrasound.         The study was marked in EPIC for immediate notification.         Workstation performed: [id number]         XR cholangiogram intraoperative    (Results Pending)       EKG, Pathology, and Other Studies Reviewed on Admission:   EKG: reviewed    ** Please Note: This note has been constructed using a voice recognition system. **

## 2024-08-26 NOTE — ASSESSMENT & PLAN NOTE
Underwent a cholecystectomy for acute cholecystitis yesterday  Being managed primarily by general surgery  Currently on full diet  Management per surgical team

## 2024-08-26 NOTE — DISCHARGE INSTR - AVS FIRST PAGE
Follow up:  Following discharge from the hospital call the office in 1-2 days to set up a post operative appointment to be seen in 2 weeks by Dr. Cloud   [phone]  Call the office if you have increased pain not relieved with pain medicine.  Call the office if you have a fever,redness, the wound opens up, you have pus draining from your incision.     AFTER YOU LEAVE: Following discharge from the hospital, you may have some questions about your procedure, your activities or your general condition.  These instructions may answer some of your questions and help you adjust during the first few days following your operation.  You can expect to be sore and tender mostly around the incisions. This pain should last approximally 5 days and gradually improve daily.          Incisions:    - You may apply ice to the incisions to help with pain. Avoid heat as this may make the glue tacky   - It is normal to have some bruising, swelling or mild discoloration around the incision.  If increasing redness or pain develops, call our office immediately.   Do not apply any creams, lotions, or ointments.    If you have dressings:  - You may remove the gauze dressing from your incisions 48 hours after surgery. Underneath this dressing is a tape like dressing called Steri Strips. Leave the steri strips in place for 5-7 days. They may fall off on their own. This is OK.    Bathing:   - You may shower daily with soap and water the day after the procedure. It is OK to GENTLY wash the incision with soap and water then pat dry.  DO NOT SCRUB. Do NOT soak incision in a tub, pool, or hot tub for 2 weeks.    Diet:   - Resume your normal diet unless specified otherwise.  We recommend you slowly advance your diet. Try to start with softer bland foods and gradually advance as tolerated. Be sure to consume plenty of water.  Avoid alcohol.        Activity/Restrictions:   - The evening following the procedure you should rest as much as possible,  sitting, lying or reclining.  you should be sure someone remains with you until the next morning.  Gradually increase your activity daily. Walking 3-4 times daily is good and stairs are ok.  Listen to your body.  If you start to get tired or sore then rest.   - No strenuous activity or exercise for 3-4 weeks.   - No heavy lifting, pushing or pulling.   - No driving for 5 days or while taking narcotics for pain.       Return or work: You may return to work or other activities as soon as your pain is controlled and you feel comfortable. For many people, this is 5 to 7 days after surgery. If your job requires heavy lifting you will need to be on light duty for 2-3 weeks.     Medication:   - If you were given a prescription for Percocet, Norco, or Vicodin for pain be sure to eat prior to taking as these medications as they may cause nausea and vomiting on an empty stomach.    - If you do not want to take stronger medications for pain, you may take Tylenol, Aleve OR Ibuprofen  - DO NOT take Tylenol  (acetaminophen) with these medication for a fever or for further pain control as these medications already contain Tylenol in them. Take one or the other.  Do not exceed more than 4000 mg of acetaminophen in 24 hours or 3000 mg if you have liver disease.   - If you were given an antibiotic take it until it is finished.    Contact your healthcare provider if:   You have a fever over 101°F (38°C) or chills.    You have pain or nausea that is not relieved by medicine.    You have redness and swelling around your incisions, or blood or pus is leaking from your incisions.    You are constipated or have diarrhea.    Your skin or eyes are yellow, or your bowel movements are pale.    You have questions or concerns about your surgery, condition, or care.  Seek care immediately or call 911 if:   You cannot stop vomiting.    Your bowel movements are black or bloody.    You have pain in your abdomen and it is swollen or hard.    Your arm  or leg feels warm, tender, and painful. It may look swollen and red.   You feel lightheaded, short of breath, and have chest pain.    You cough up blood.

## 2024-08-28 ENCOUNTER — TELEPHONE (OUTPATIENT)
Age: 54
End: 2024-08-28

## 2024-08-28 PROCEDURE — 88304 TISSUE EXAM BY PATHOLOGIST: CPT | Performed by: PATHOLOGY

## 2024-08-28 NOTE — TELEPHONE ENCOUNTER
Patient called to schedule post op appt, Dr Cloud wanted to see her around 9/9 but I could not find any openings until the end of Sept.  Could someone reach out to her to schedule her post op sooner?  Her call back number is . Thanks.

## 2024-08-30 NOTE — TELEPHONE ENCOUNTER
Tried calling patient could not leave message voicemail box is full. Patient does not have MyChart either. Patient needs a post op appointment.

## 2024-09-04 ENCOUNTER — TELEPHONE (OUTPATIENT)
Dept: SURGERY | Facility: CLINIC | Age: 54
End: 2024-09-04

## 2024-09-04 NOTE — TELEPHONE ENCOUNTER
Tried calling patient on making a post op appointment could not leave message voicemail box is full.

## 2024-09-05 ENCOUNTER — CONSULT (OUTPATIENT)
Dept: GASTROENTEROLOGY | Facility: CLINIC | Age: 54
End: 2024-09-05
Payer: COMMERCIAL

## 2024-09-05 VITALS
HEIGHT: 60 IN | OXYGEN SATURATION: 97 % | TEMPERATURE: 97.9 F | HEART RATE: 101 BPM | WEIGHT: 153 LBS | DIASTOLIC BLOOD PRESSURE: 70 MMHG | SYSTOLIC BLOOD PRESSURE: 105 MMHG | BODY MASS INDEX: 30.04 KG/M2

## 2024-09-05 DIAGNOSIS — K83.9 BILE DUCT ABNORMALITY: ICD-10-CM

## 2024-09-05 DIAGNOSIS — R79.89 ELEVATED LFTS: Primary | ICD-10-CM

## 2024-09-05 DIAGNOSIS — K81.0 ACUTE CHOLECYSTITIS: ICD-10-CM

## 2024-09-05 PROCEDURE — 99203 OFFICE O/P NEW LOW 30 MIN: CPT | Performed by: PHYSICIAN ASSISTANT

## 2024-09-05 NOTE — PROGRESS NOTES
[facility]  Albina Dykes 54 y.o. female MRN: [id number]       CC: Elevated LFTs, recent cholecystectomy    HPI: Albina is a pleasant 54-year-old female who is status post cholecystectomy on August 25 during recent admission to the hospital.  Patient was referred to our office for elevated LFTs during her admission.    On admission, LFTs were as follows: , , alkaline phosphatase 171 and total bilirubin 2.12.  Upon discharge, her LFTs were already downtrending with normalization of bilirubin.  Patient underwent cholecystectomy on August 25, and IOC was negative.  However, the surgery team states that they felt the hepatic duct appeared narrowed.  She was referred to our office for continued follow-up and monitoring.  Patient reports that she does have some postoperative discomfort, and was waiting to schedule an appointment with Dr. Cloud.  We will help her get an appointment since he is in the same office.  Otherwise, denies dark-colored urine or severe abdominal pain.    With regard to liver disease, patient states that her family history does include those who abused alcohol who may have had liver issues.  Otherwise, no other diagnoses.  She had a negative Cologuard in 2023.  No alcohol abuse history herself.    Review of Systems:    CONSTITUTIONAL: Denies any fever, chills, or rigors. Good appetite, and no recent weight loss.  HEENT: No earache or tinnitus. Denies hearing loss or visual disturbances.  CARDIOVASCULAR: No chest pain or palpitations.   RESPIRATORY: Denies any cough, hemoptysis, shortness of breath or dyspnea on exertion.  GASTROINTESTINAL: As noted in the History of Present Illness.   GENITOURINARY: No problems with urination. Denies any hematuria or dysuria.  NEUROLOGIC: No dizziness or vertigo, denies headaches.   MUSCULOSKELETAL: Denies any muscle or joint pain.   SKIN: Denies skin rashes or itching.   ENDOCRINE: Denies excessive thirst. Denies intolerance  to heat or cold.  PSYCHOSOCIAL: Denies depression or anxiety. Denies any recent memory loss.       Current Outpatient Medications   Medication Sig Dispense Refill    ALBUTEROL IN Inhale      cyclobenzaprine (FLEXERIL) 5 mg tablet Take 5 mg by mouth 3 (three) times a day as needed for muscle spasms      fluticasone (FLONASE) 50 mcg/act nasal spray 2 sprays into each nostril daily      HYDROcodone-acetaminophen (NORCO) 5-325 mg per tablet Take 1 tablet by mouth every 6 (six) hours as needed for pain      ibuprofen (MOTRIN) 600 mg tablet Take 1 tablet (600 mg total) by mouth every 6 (six) hours as needed for mild pain for up to 6 days 24 tablet 0    loratadine (CLARITIN) 10 mg tablet loratadine 10 mg tablet   TAKE 1 TABLET BY MOUTH ONCE DAILY      loratadine-pseudoephedrine (CLARITIN-D 24-HOUR)  mg per 24 hr tablet Take 1 tablet by mouth daily      pravastatin (PRAVACHOL) 20 mg tablet pravastatin 20 mg tablet   TAKE 1 TABLET BY MOUTH NIGHTLY      pseudoephedrine (SUDAFED) 30 mg tablet Take 60 mg by mouth every 4 (four) hours as needed for congestion      ondansetron (ZOFRAN-ODT) 4 mg disintegrating tablet Take 4 mg by mouth every 6 (six) hours as needed for nausea or vomiting (Patient not taking: Reported on 2024)       No current facility-administered medications for this visit.     Past Medical History:   Diagnosis Date    Heart burn     Kidney stones      Past Surgical History:   Procedure Laterality Date     SECTION      CHOLECYSTECTOMY LAPAROSCOPIC N/A 2024    Procedure: CHOLECYSTECTOMY LAPAROSCOPIC W/ INTRAOP CHOLANGIOGRAM;  Surgeon: Lm Cloud MD;  Location: [facility];  Service: General     Social History     Socioeconomic History    Marital status:      Spouse name: None    Number of children: None    Years of education: None    Highest education level: None   Occupational History    None   Tobacco Use    Smoking status: Never    Smokeless tobacco: Never   Vaping Use     Vaping status: Never Used   Substance and Sexual Activity    Alcohol use: Never    Drug use: Yes     Types: Marijuana    Sexual activity: None   Other Topics Concern    None   Social History Narrative    None     Social Determinants of Health     Financial Resource Strain: Not on file   Food Insecurity: Food Insecurity Present (8/25/2024)    Hunger Vital Sign     Worried About Running Out of Food in the Last Year: Sometimes true     Ran Out of Food in the Last Year: Sometimes true   Transportation Needs: No Transportation Needs (8/25/2024)    PRAPARE - Transportation     Lack of Transportation (Medical): No     Lack of Transportation (Non-Medical): No   Physical Activity: Not on file   Stress: Not on file   Social Connections: Not on file   Intimate Partner Violence: Not on file   Housing Stability: High Risk (8/25/2024)    Housing Stability Vital Sign     Unable to Pay for Housing in the Last Year: Yes     Number of Times Moved in the Last Year: 20     Homeless in the Last Year: Yes     Family History   Problem Relation Age of Onset    Heart attack Father             PHYSICAL EXAM:    There were no vitals filed for this visit.  General Appearance:   Alert and oriented x 3. Cooperative, and in no respiratory distress   HEENT:   Normocephalic, atraumatic, anicteric.     Neck:  Supple, symmetrical, trachea midline   Lungs:   Clear to auscultation bilaterally    Heart::   Regular rate and rhythm   Abdomen:   Soft, non-tender, non-distended; normal bowel sounds; no masses, no organomegaly    Genitalia:   Deferred    Rectal:   Deferred    Extremities:  No cyanosis, clubbing or edema    Pulses:  2+ and symmetric all extremities    Skin:  Skin color, texture, turgor normal, no rashes or lesions    Lymph nodes:  No palpable cervical or supraclavicular lymphadenopathy        Lab Results:   Results from last 6 Months   Lab Units 08/26/24  0458   WBC Thousand/uL 6.62   HEMOGLOBIN g/dL 11.0*   HEMATOCRIT % 32.0*   PLATELETS  "Thousands/uL 128*   SEGS PCT % 83*   LYMPHO PCT % 9*   MONO PCT % 7   EOS PCT % 0     Results from last 6 Months   Lab Units 08/26/24  0458   POTASSIUM mmol/L 3.8   CHLORIDE mmol/L 106   CO2 mmol/L 26   BUN mg/dL 10   CREATININE mg/dL 0.51*   CALCIUM mg/dL 8.9   ALK PHOS U/L 162*   ALT U/L 208*   AST U/L 113*               Imaging Studies:   XR cholangiogram intraoperative    Result Date: 8/26/2024  Impression: No filling defects. Free flow of contrast into the duodenum. Please see procedure report for further details. Workstation performed: [id number]     CT abdomen pelvis with contrast    Result Date: 8/24/2024  Impression: 1.  Diffuse gallbladder wall thickening. No calcified stones or inflammation. Correlate clinically for evidence of acute cholecystitis and suggest further evaluation with right upper quadrant ultrasound. 2.  Mild bladder wall thickening versus prominence from under distention. Correlate for evidence of cystitis. 3.  A 1.8 cm septated left renal cyst, not identified on prior ultrasound. Recommend 6-month follow-up ultrasound. The study was marked in EPIC for immediate notification. Workstation performed: [id number]       ASSESSMENT and PLAN:      1) Elevated LFTs, questionable narrowing at the hepatic duct - Per surgery team, during IOC it was noted that the patient had narrowing near the hepatic duct.  The IOC report is unremarkable.  LFTs are already trending downward during admission, likely related to cholecystitis and possible passed stone.  - Check LFTs and chronic hepatitis panel in 2 weeks  - We arranged follow-up with Dr. Cloud for next week postoperatively  - Check MRCP  - Patient agrees with plan      Follow up in 3 months or sooner if MRI is abnormal.      Portions of the record may have been created with voice recognition software.  Occasional wrong word or \"sound a like\" substitutions may have occurred due to the inherent limitations of voice recognition software.  Read the chart " carefully and recognize, using context, where substitutions have occurred.

## 2024-09-11 ENCOUNTER — OFFICE VISIT (OUTPATIENT)
Dept: SURGERY | Facility: CLINIC | Age: 54
End: 2024-09-11

## 2024-09-11 VITALS
TEMPERATURE: 98.4 F | OXYGEN SATURATION: 97 % | WEIGHT: 154.8 LBS | HEIGHT: 60 IN | RESPIRATION RATE: 18 BRPM | BODY MASS INDEX: 30.39 KG/M2 | SYSTOLIC BLOOD PRESSURE: 119 MMHG | HEART RATE: 59 BPM | DIASTOLIC BLOOD PRESSURE: 74 MMHG

## 2024-09-11 DIAGNOSIS — K21.9 GERD (GASTROESOPHAGEAL REFLUX DISEASE): ICD-10-CM

## 2024-09-11 DIAGNOSIS — Z48.89 POSTOPERATIVE VISIT: Primary | ICD-10-CM

## 2024-09-11 PROCEDURE — 99024 POSTOP FOLLOW-UP VISIT: CPT | Performed by: SURGERY

## 2024-09-11 RX ORDER — PANTOPRAZOLE SODIUM 40 MG/1
40 TABLET, DELAYED RELEASE ORAL DAILY
Qty: 90 TABLET | Refills: 0 | Status: SHIPPED | OUTPATIENT
Start: 2024-09-11

## 2024-09-11 NOTE — PROGRESS NOTES
Post-Op Follow Up- General Surgery   Albina Dykes 54 y.o. female MRN: [id number]  Unit/Bed#:  Encounter: [id number]    Assessment & Plan     Assessment:  Status post laparoscopic cholecystectomy with cholangiograms, improved  Gastritis with GERD  Plan:  The patient is doing quite well from the surgical standpoint, she does have symptoms of gastritis, I will prescribe Protonix daily.  The patient is a scheduled for MRI next week due to intraoperative cholangiograms findings.    History of Present Illness     HPI:  Albina Dykes is a 54 y.o. female who presents to my office for first postop follow-up after laparoscopic cholecystectomy with cholangiograms from  for acute cholecystitis.  Patient complains of postprandial pain with nausea since surgery, she denies having any vomiting, chills, fever, change in color urine or stool.  I did review pathology and cholangiogram results, the patient is scheduled for MRCP next week.  She denies having any issues with the abdominal incisions.    Historical Information   Past Medical History:   Diagnosis Date    Asthma     Heart burn     Kidney stones      Past Surgical History:   Procedure Laterality Date     SECTION      CHOLECYSTECTOMY      CHOLECYSTECTOMY LAPAROSCOPIC N/A 2024    Procedure: CHOLECYSTECTOMY LAPAROSCOPIC W/ INTRAOP CHOLANGIOGRAM;  Surgeon: Lm Cloud MD;  Location: [facility] OR;  Service: General     Social History   Social History     Substance and Sexual Activity   Alcohol Use Not Currently    Comment: when on vacation a glass or two, no more     Social History     Substance and Sexual Activity   Drug Use Yes    Types: Marijuana     Social History     Tobacco Use   Smoking Status Never    Passive exposure: Never   Smokeless Tobacco Never     Family History: Family history non-contributory    Meds/Allergies   all medications and allergies reviewed     Current Outpatient Medications:     ALBUTEROL IN, Inhale, Disp: , Rfl:      cyclobenzaprine (FLEXERIL) 5 mg tablet, Take 5 mg by mouth 3 (three) times a day as needed for muscle spasms, Disp: , Rfl:     fluticasone (FLONASE) 50 mcg/act nasal spray, 2 sprays into each nostril daily, Disp: , Rfl:     HYDROcodone-acetaminophen (NORCO) 5-325 mg per tablet, Take 1 tablet by mouth every 6 (six) hours as needed for pain, Disp: , Rfl:     ibuprofen (MOTRIN) 600 mg tablet, Take 1 tablet (600 mg total) by mouth every 6 (six) hours as needed for mild pain for up to 6 days, Disp: 24 tablet, Rfl: 0    loratadine (CLARITIN) 10 mg tablet, loratadine 10 mg tablet  TAKE 1 TABLET BY MOUTH ONCE DAILY, Disp: , Rfl:     loratadine-pseudoephedrine (CLARITIN-D 24-HOUR)  mg per 24 hr tablet, Take 1 tablet by mouth daily, Disp: , Rfl:     pantoprazole (PROTONIX) 40 mg tablet, Take 1 tablet (40 mg total) by mouth daily, Disp: 90 tablet, Rfl: 0    pravastatin (PRAVACHOL) 20 mg tablet, pravastatin 20 mg tablet  TAKE 1 TABLET BY MOUTH NIGHTLY, Disp: , Rfl:     pseudoephedrine (SUDAFED) 30 mg tablet, Take 60 mg by mouth every 4 (four) hours as needed for congestion, Disp: , Rfl:     ondansetron (ZOFRAN-ODT) 4 mg disintegrating tablet, Take 4 mg by mouth every 6 (six) hours as needed for nausea or vomiting (Patient not taking: Reported on 9/5/2024), Disp: , Rfl:   Allergies   Allergen Reactions    Suture Rash    Codeine     Diphenhydramine Tongue Swelling     Medicated Sleeping Pills    Latex Other (See Comments)    Oxycodone     Other Rash     Added based on information entered during case entry, please review and add reactions, type, and severity as needed       Objective     Current Vitals:   Blood Pressure: 119/74 (09/11/24 1045)  Pulse: 59 (09/11/24 1045)  Temperature: 98.4 °F (36.9 °C) (09/11/24 1045)  Temp Source: Temporal (09/11/24 1045)  Respirations: 18 (09/11/24 1045)  Height: 5' (152.4 cm) (09/11/24 1045)  Weight - Scale: 70.2 kg (154 lb 12.8 oz) (09/11/24 1045)  SpO2: 97 % (09/11/24 1045)    Physical  Exam  Vitals and nursing note reviewed.   Abdominal:      Comments: Abdomen is soft, nondistended and mild to moderate tenderness in the epigastrium.  Laparoscopic incisions are well-healed without evidence of infection or incisional hernia.         Lab Results: I have personally reviewed pertinent lab results.

## 2024-10-15 ENCOUNTER — OFFICE VISIT (OUTPATIENT)
Dept: DENTISTRY | Facility: CLINIC | Age: 54
End: 2024-10-15

## 2024-10-15 VITALS — DIASTOLIC BLOOD PRESSURE: 73 MMHG | HEART RATE: 77 BPM | SYSTOLIC BLOOD PRESSURE: 109 MMHG

## 2024-10-15 DIAGNOSIS — K08.409 PARTIAL ANODONTIA, ACQUIRED: Primary | ICD-10-CM

## 2024-10-15 PROCEDURE — D0150 COMPREHENSIVE ORAL EVALUATION - NEW OR ESTABLISHED PATIENT: HCPCS

## 2024-10-15 PROCEDURE — D0210 INTRAORAL - COMPLETE SERIES OF RADIOGRAPHIC IMAGES: HCPCS

## 2024-10-15 NOTE — DENTAL PROCEDURE DETAILS
"   COMP EXAM, FMX, PROBE EXAM   REVIEWED MED HX: meds, allergies, health changes reviewed in EPIC  CHIEF CONCERN:     - CC: \"Would like to check up on rest of my teeth\"  -Last dental visit was \"before COVID\"  PAIN SCALE:  0  ASA CLASS:  ASA 2 - Patient with mild systemic disease with no functional limitations  PLAQUE:  heavy  CALCULUS: Generalized  Heavy  Lower anteriors  Posteriors  BLEEDING:  heavy  STAIN :  Generalized  Heavy  Coffee/Tea  Non-smoker  PERIO: Gingivitis with severe inflammation; However, resident cannot accurately diagnose as of current moment due to severe buildup of calculus. Pt needs full mouth debridement, re-do periocharting, and examine periodontal status for future disease control.     Visual and Tactile Intraoral/ Extraoral evaluation: Oral and Oropharyngeal cancer evaluation. Oral cancer findings WNL.    - Caries on #2 MBD, 4 MO, 8 MFL, 14 MO, 31O.  - Large existing resin restoration on #9 MIDFL    Cold on #9: Normal; Percussion: Normal; Palpation: Normal  - PARL on #19   Cold on #19: NR; Percussion: Normal; Palpation: Normal  - Missing teeth #3,5,13,20,29  - Crowded #22-27  - Mesially tilted #14,19, and 30    Dr. Camilo Gardner -  Reviewed with patient clinical and radiographic findings and patient verbalized understanding. All questions and concerns addressed.     REFERRALS: None    CARIES FINDINGS: 2 MBD, 4 MO, 8 MFL, 14 MO, 31O       NEXT VISIT:   1) Full mouth debridement -  will schedule once pre-auth comes back    Last FMX : Taken today  "

## 2024-11-15 ENCOUNTER — TELEPHONE (OUTPATIENT)
Dept: GASTROENTEROLOGY | Facility: CLINIC | Age: 54
End: 2024-11-15

## 2024-11-15 NOTE — TELEPHONE ENCOUNTER
----- Message from Laxmi Brenner PA-C sent at 11/14/2024  8:59 PM EST -----  Please remind patient to schedule MRI

## 2024-11-15 NOTE — TELEPHONE ENCOUNTER
Called and spoke to patient.  Gave patient message to remind her to schedule MRI. Patient was driving so could not write down central scheduling's #. She requested it be emailed to : [email or web address]

## (undated) DEVICE — TAUT CATH INTRODUCER 4.5 FR

## (undated) DEVICE — LIGHT HANDLE COVER SLEEVE DISP BLUE STELLAR

## (undated) DEVICE — IV CATH 14 G X 1.75

## (undated) DEVICE — TUBING SMOKE EVAC W/FILTRATION DEVICE PLUMEPORT ACTIV

## (undated) DEVICE — SUT VICRYL 4-0 PS-2 27 IN J426H

## (undated) DEVICE — SCD SEQUENTIAL COMPRESSION COMFORT SLEEVE MEDIUM KNEE LENGTH: Brand: KENDALL SCD

## (undated) DEVICE — TROCAR: Brand: KII FIOS FIRST ENTRY

## (undated) DEVICE — CHLORAPREP HI-LITE 26ML ORANGE

## (undated) DEVICE — 3M™ STERI-STRIP™ REINFORCED ADHESIVE SKIN CLOSURES, R1542, 1/4 IN X 1-1/2 IN (6 MM X 38 MM), 6 STRIPS/ENVELOPE: Brand: 3M™ STERI-STRIP™

## (undated) DEVICE — 3M™ STERI-STRIP™ REINFORCED ADHESIVE SKIN CLOSURES, R1546, 1/4 IN X 4 IN (6 MM X 100 MM), 10 STRIPS/ENVELOPE: Brand: 3M™ STERI-STRIP™

## (undated) DEVICE — 4-PORT MANIFOLD: Brand: NEPTUNE 2

## (undated) DEVICE — SYRINGE 20ML LL

## (undated) DEVICE — LIGAMAX 5 MM ENDOSCOPIC MULTIPLE CLIP APPLIER: Brand: LIGAMAX

## (undated) DEVICE — ELECTRODE LAP L WIRE E-Z CLEAN 33CM -0100

## (undated) DEVICE — DRAPE C-ARM X-RAY

## (undated) DEVICE — SYRINGE 5ML LL

## (undated) DEVICE — TROCAR: Brand: KII® SLEEVE

## (undated) DEVICE — SYRINGE 10ML LL

## (undated) DEVICE — TOWEL SURG XR DETECT GREEN STRL RFD

## (undated) DEVICE — 3M™ TEGADERM™ TRANSPARENT FILM DRESSING FRAME STYLE, 1624W, 2-3/8 IN X 2-3/4 IN (6 CM X 7 CM), 100/CT 4CT/CASE: Brand: 3M™ TEGADERM™

## (undated) DEVICE — PAD GROUNDING DUAL ADULT

## (undated) DEVICE — GLOVE INDICATOR PI UNDERGLOVE SZ 7.5 BLUE

## (undated) DEVICE — [HIGH FLOW INSUFFLATOR,  DO NOT USE IF PACKAGE IS DAMAGED,  KEEP DRY,  KEEP AWAY FROM SUNLIGHT,  PROTECT FROM HEAT AND RADIOACTIVE SOURCES.]: Brand: PNEUMOSURE

## (undated) DEVICE — TISSUE RETRIEVAL SYSTEM: Brand: INZII RETRIEVAL SYSTEM

## (undated) DEVICE — INTENDED FOR TISSUE SEPARATION, AND OTHER PROCEDURES THAT REQUIRE A SHARP SURGICAL BLADE TO PUNCTURE OR CUT.: Brand: BARD-PARKER SAFETY BLADES SIZE 15, STERILE

## (undated) DEVICE — NEPTUNE E-SEP SMOKE EVACUATION PENCIL, COATED, 70MM BLADE, PUSH BUTTON SWITCH: Brand: NEPTUNE E-SEP

## (undated) DEVICE — ALLENTOWN LAP CHOLE APP PACK: Brand: CARDINAL HEALTH

## (undated) DEVICE — IV SET 15 DROP 3/Y GRAVITY CARESITE

## (undated) DEVICE — 5 MM CURVED DISSECTORS WITH MONOPOLAR CAUTERY: Brand: ENDOPATH

## (undated) DEVICE — METZENBAUM ADTEC SINGLE USE DISSECTING SCISSORS, SHAFT ONLY, MONOPOLAR, CURVED TO LEFT, WORKING LENGTH: 12 1/4", (310 MM), DIAM. 5 MM, INSULATED, DOUBLE ACTION, STERILE, DISPOSABLE, PACKAGE OF 10 PIECES: Brand: AESCULAP

## (undated) DEVICE — GAUZE SPONGES,8 PLY: Brand: CURITY

## (undated) DEVICE — DRAPE EQUIPMENT RF WAND

## (undated) DEVICE — COTTON TIP APPLICTOR 2 PK